# Patient Record
Sex: FEMALE | Race: WHITE | NOT HISPANIC OR LATINO | Employment: FULL TIME | URBAN - METROPOLITAN AREA
[De-identification: names, ages, dates, MRNs, and addresses within clinical notes are randomized per-mention and may not be internally consistent; named-entity substitution may affect disease eponyms.]

---

## 2018-10-19 ENCOUNTER — OFFICE VISIT (OUTPATIENT)
Dept: INTERNAL MEDICINE | Facility: CLINIC | Age: 38
End: 2018-10-19
Payer: COMMERCIAL

## 2018-10-19 VITALS
HEART RATE: 95 BPM | HEIGHT: 65 IN | BODY MASS INDEX: 31.63 KG/M2 | DIASTOLIC BLOOD PRESSURE: 68 MMHG | SYSTOLIC BLOOD PRESSURE: 135 MMHG | WEIGHT: 189.81 LBS

## 2018-10-19 DIAGNOSIS — Z00.00 ANNUAL PHYSICAL EXAM: Primary | ICD-10-CM

## 2018-10-19 PROCEDURE — 99999 PR PBB SHADOW E&M-EST. PATIENT-LVL III: CPT | Mod: PBBFAC,,, | Performed by: INTERNAL MEDICINE

## 2018-10-19 PROCEDURE — 99385 PREV VISIT NEW AGE 18-39: CPT | Mod: S$GLB,,, | Performed by: INTERNAL MEDICINE

## 2018-10-19 NOTE — PROGRESS NOTES
Answers for HPI/ROS submitted by the patient on 10/19/2018   activity change: No  unexpected weight change: Yes  neck pain: No  hearing loss: No  rhinorrhea: No  trouble swallowing: No  eye discharge: No  visual disturbance: No  chest tightness: No  wheezing: No  chest pain: No  palpitations: No  blood in stool: No  constipation: Yes  vomiting: No  diarrhea: No  polydipsia: No  polyuria: No  difficulty urinating: No  hematuria: No  menstrual problem: No  dysuria: No  joint swelling: No  arthralgias: Yes  headaches: No  weakness: No  confusion: No  dysphoric mood: No

## 2018-10-19 NOTE — PROGRESS NOTES
"Discussed Subjective:       Patient ID: Maria Alejandra Perez is a 38 y.o. female.    Chief Complaint: Annual Exam   This is a 38-year-old presents today for physical.  Patient reports that she has been doing well overall she has had history with her weight through her life in previously weighed about 300 lb she worked on improved exercise and dietary measures and got her weight down.  She reports that lately though it has been up again her lowest weight is about 150 and she feels like it is trending back up and is unable to bring it back down she does exercise 6 days a week she tries to watch her diet although isn't always is good that she does eat healthy most of the time.   She feels her health overall is good she does not usually get sick and her pressure is usually good at home she gets quite nervous when she comes to the doctor and feels her heart rate and pressure go up.  She was wondering about her thyroid her hormones she did have a hysterectomy when she was younger due to heavy menstrual cycles and fibroids    HPI  Review of Systems   Constitutional: Positive for unexpected weight change. Negative for activity change.   HENT: Negative for hearing loss, rhinorrhea and trouble swallowing.    Eyes: Negative for discharge and visual disturbance.   Respiratory: Negative for chest tightness and wheezing.    Cardiovascular: Negative for chest pain and palpitations.   Gastrointestinal: Positive for constipation. Negative for blood in stool, diarrhea and vomiting.   Endocrine: Negative for polydipsia and polyuria.   Genitourinary: Negative for difficulty urinating, dysuria, hematuria and menstrual problem.   Musculoskeletal: Positive for arthralgias. Negative for joint swelling and neck pain.   Neurological: Negative for weakness and headaches.   Psychiatric/Behavioral: Negative for confusion and dysphoric mood.       Objective:     Blood pressure 135/68, pulse 95, height 5' 5" (1.651 m), weight 86.1 kg (189 lb 13.1 " oz).    Physical Exam   Constitutional: No distress.   HENT:   Head: Normocephalic.   Mouth/Throat: Oropharynx is clear and moist.   Eyes: No scleral icterus.   Neck: Neck supple.   Cardiovascular: Normal rate, regular rhythm and normal heart sounds. Exam reveals no gallop and no friction rub.   No murmur heard.  Pulmonary/Chest: Effort normal and breath sounds normal. No respiratory distress.   Abdominal: Soft. Bowel sounds are normal. She exhibits no mass. There is no tenderness.   Surgical scar abdomen   Musculoskeletal: She exhibits no edema.   Neurological: She is alert.   Skin: No erythema.   Psychiatric: She has a normal mood and affect.   Vitals reviewed.      Assessment:       1. Annual physical exam        Plan:       Maria Alejandra was seen today for annual exam.    Diagnoses and all orders for this visit:    Annual physical exam  -     Basic metabolic panel; Future  -     CBC auto differential; Future  -     Hepatic function panel; Future  -     Lipid panel; Future  -     TSH; Future  -     Follicle stimulating hormone; Future    She had her flu shot declined tetanus/pertussis     As for her blood pressure advised home blood pressure monitoring call with elevations in the guidelines were reviewed with her    For her weight advised continued exercise healthy dietary measures as she has been will update her blood work check her thyroid as well and review follow-up annually sooner if concern

## 2018-10-26 ENCOUNTER — PATIENT MESSAGE (OUTPATIENT)
Dept: INTERNAL MEDICINE | Facility: CLINIC | Age: 38
End: 2018-10-26

## 2018-10-29 ENCOUNTER — LAB VISIT (OUTPATIENT)
Dept: LAB | Facility: HOSPITAL | Age: 38
End: 2018-10-29
Attending: INTERNAL MEDICINE
Payer: COMMERCIAL

## 2018-10-29 DIAGNOSIS — Z00.00 ANNUAL PHYSICAL EXAM: ICD-10-CM

## 2018-10-29 LAB
ALBUMIN SERPL BCP-MCNC: 4 G/DL
ALP SERPL-CCNC: 76 U/L
ALT SERPL W/O P-5'-P-CCNC: 29 U/L
ANION GAP SERPL CALC-SCNC: 8 MMOL/L
AST SERPL-CCNC: 25 U/L
BASOPHILS # BLD AUTO: 0.03 K/UL
BASOPHILS NFR BLD: 0.4 %
BILIRUB DIRECT SERPL-MCNC: 0.2 MG/DL
BILIRUB SERPL-MCNC: 0.5 MG/DL
BUN SERPL-MCNC: 11 MG/DL
CALCIUM SERPL-MCNC: 9.4 MG/DL
CHLORIDE SERPL-SCNC: 108 MMOL/L
CHOLEST SERPL-MCNC: 184 MG/DL
CHOLEST/HDLC SERPL: 2.7 {RATIO}
CO2 SERPL-SCNC: 24 MMOL/L
CREAT SERPL-MCNC: 0.7 MG/DL
DIFFERENTIAL METHOD: ABNORMAL
EOSINOPHIL # BLD AUTO: 0.2 K/UL
EOSINOPHIL NFR BLD: 2.6 %
ERYTHROCYTE [DISTWIDTH] IN BLOOD BY AUTOMATED COUNT: 12.4 %
EST. GFR  (AFRICAN AMERICAN): >60 ML/MIN/1.73 M^2
EST. GFR  (NON AFRICAN AMERICAN): >60 ML/MIN/1.73 M^2
FSH SERPL-ACNC: 9.3 MIU/ML
GLUCOSE SERPL-MCNC: 88 MG/DL
HCT VFR BLD AUTO: 39.6 %
HDLC SERPL-MCNC: 68 MG/DL
HDLC SERPL: 37 %
HGB BLD-MCNC: 12.7 G/DL
LDLC SERPL CALC-MCNC: 97 MG/DL
LYMPHOCYTES # BLD AUTO: 2.7 K/UL
LYMPHOCYTES NFR BLD: 39.2 %
MCH RBC QN AUTO: 31.2 PG
MCHC RBC AUTO-ENTMCNC: 32.1 G/DL
MCV RBC AUTO: 97 FL
MONOCYTES # BLD AUTO: 0.5 K/UL
MONOCYTES NFR BLD: 6.5 %
NEUTROPHILS # BLD AUTO: 3.5 K/UL
NEUTROPHILS NFR BLD: 50.7 %
NONHDLC SERPL-MCNC: 116 MG/DL
PLATELET # BLD AUTO: 192 K/UL
PMV BLD AUTO: 10.4 FL
POTASSIUM SERPL-SCNC: 4.8 MMOL/L
PROT SERPL-MCNC: 6.8 G/DL
RBC # BLD AUTO: 4.07 M/UL
SODIUM SERPL-SCNC: 140 MMOL/L
TRIGL SERPL-MCNC: 95 MG/DL
TSH SERPL DL<=0.005 MIU/L-ACNC: 1.25 UIU/ML
WBC # BLD AUTO: 6.97 K/UL

## 2018-10-29 PROCEDURE — 85025 COMPLETE CBC W/AUTO DIFF WBC: CPT

## 2018-10-29 PROCEDURE — 80061 LIPID PANEL: CPT

## 2018-10-29 PROCEDURE — 80076 HEPATIC FUNCTION PANEL: CPT

## 2018-10-29 PROCEDURE — 83001 ASSAY OF GONADOTROPIN (FSH): CPT

## 2018-10-29 PROCEDURE — 84443 ASSAY THYROID STIM HORMONE: CPT

## 2018-10-29 PROCEDURE — 80048 BASIC METABOLIC PNL TOTAL CA: CPT

## 2018-10-29 PROCEDURE — 36415 COLL VENOUS BLD VENIPUNCTURE: CPT

## 2019-12-23 ENCOUNTER — OFFICE VISIT (OUTPATIENT)
Dept: URGENT CARE | Facility: CLINIC | Age: 39
End: 2019-12-23
Payer: COMMERCIAL

## 2019-12-23 VITALS
BODY MASS INDEX: 31.49 KG/M2 | SYSTOLIC BLOOD PRESSURE: 144 MMHG | TEMPERATURE: 99 F | OXYGEN SATURATION: 99 % | RESPIRATION RATE: 12 BRPM | HEIGHT: 65 IN | HEART RATE: 114 BPM | DIASTOLIC BLOOD PRESSURE: 86 MMHG | WEIGHT: 189 LBS

## 2019-12-23 DIAGNOSIS — M25.531 RIGHT WRIST PAIN: Primary | ICD-10-CM

## 2019-12-23 PROCEDURE — 99214 PR OFFICE/OUTPT VISIT, EST, LEVL IV, 30-39 MIN: ICD-10-PCS | Mod: S$GLB,,, | Performed by: NURSE PRACTITIONER

## 2019-12-23 PROCEDURE — 73110 XR WRIST COMPLETE 3 VIEWS RIGHT: ICD-10-PCS | Mod: RT,S$GLB,, | Performed by: RADIOLOGY

## 2019-12-23 PROCEDURE — 73110 X-RAY EXAM OF WRIST: CPT | Mod: RT,S$GLB,, | Performed by: RADIOLOGY

## 2019-12-23 PROCEDURE — 99214 OFFICE O/P EST MOD 30 MIN: CPT | Mod: S$GLB,,, | Performed by: NURSE PRACTITIONER

## 2019-12-24 NOTE — PATIENT INSTRUCTIONS
Please be aware your blood pressure was slightly elevated today -  Make sure to take your blood pressure medicines, eat a low salt diet and recheck your blood pressure to make sure it is not getting too elevated ( greater than 160/100).   Advised pt to take bp again when well - if still elevated f/u with pcp.     PLEASE READ YOUR DISCHARGE INSTRUCTIONS ENTIRELY AS IT CONTAINS IMPORTANT INFORMATION.    Tylenol and ibuprofen for pain    Rest, ice, compress, and elevate at home    Avoid prolonged use of the affected area until better.     Please return or see your primary care doctor if you develop new or worsening symptoms.     Please arrange follow up with your primary medical clinic as soon as possible. You must understand that you've received an Urgent Care treatment only and that you may be released before all of your medical problems are known or treated. You, the patient, will arrange for follow up as instructed. If your symptoms worsen or fail to improve you should go to the Emergency Room.    Self-Care for Strains and Sprains  Most minor strains and sprains can be treated with self-care. Recovering from a strain or sprain may take 6 to 8 weeks. Your self-care goal is to reduce pain and immobilize the injury to speed healing.     A sprain injures ligaments (tissue that connects bones to bones).        A strain injures muscles or tendons (tissue that connects muscles to bones).   Support the injured area  Wrapping the injured area provides support for short, necessary activities. Be careful not to wrap the area too tightly. This could cut off the blood supply.  · Support a wrist, elbow, or shoulder with a sling.  · Wrap an ankle or knee with an elastic bandage.  · Tape a finger or toe to the one next to it.  Use cold and heat  Cold reduces swelling. Both cold and heat reduce pain. Heat should not be used in the initial treatment of the injury. When using cold or heat, always place a towel between the pack and  your skin.  · Apply ice or a cold pack 10 to 15 minutes every hour youre awake for the first 2 days.  · After the swelling goes down, use cold or heat to control pain. Dont use heat late in the day, since it can cause swelling when youre not active.  Rest and elevate  Rest and elevation help your injury heal faster.  · Raise the injured area above your heart level.  · Keep the injured area from moving.  · Limit the use of the joint or limb.  Use medicine  · Aspirin reduces pain and swelling. (Note: Dont give aspirin to a child 18 or younger unless prescribed by the doctor.)  · Aspirin substitutes, such as ibuprofen, can reduce pain. Some substitutes reduce swelling, too. Ask your pharmacist which substitutes you can use.  Call your doctor if:  · The injured joint wont move, or bones make a grating sound when they move.  · You cant put weight on the injured area, even after 24 hours.  · The injured body part is cold, blue, or numb.  · The joint or limb appears bent or crooked.  · Pain increases or doesnt improve in 4 days.  · When pressing along the injured area, you notice a spot that is especially painful.   Date Last Reviewed: 9/29/2015  © 0897-4004 [employer]. [address] [place] [address], [place] [address]. All rights reserved. This information is not intended as a substitute for professional medical care. Always follow your healthcare professional's instructions.

## 2019-12-24 NOTE — PROGRESS NOTES
"Subjective:       Patient ID: Maria Alejandra Perez is a 39 y.o. female.    Vitals:  height is 5' 5" (1.651 m) and weight is 85.7 kg (189 lb). Her temperature is 99.3 °F (37.4 °C). Her blood pressure is 144/86 (abnormal) and her pulse is 114 (abnormal). Her respiration is 12 and oxygen saturation is 99%.     Chief Complaint: Wrist Injury    Pt. States she hit her wrist off of something       Constitution: Negative for chills, fatigue and fever.   HENT: Negative for congestion and sore throat.    Neck: Negative for painful lymph nodes.   Cardiovascular: Negative for chest pain and leg swelling.   Eyes: Negative for double vision and blurred vision.   Respiratory: Negative for cough and shortness of breath.    Gastrointestinal: Negative for nausea, vomiting and diarrhea.   Genitourinary: Negative for dysuria, frequency, urgency and history of kidney stones.   Musculoskeletal: Positive for pain. Negative for joint pain, joint swelling, muscle cramps and muscle ache.   Skin: Negative for color change, pale, rash and bruising.   Allergic/Immunologic: Negative for seasonal allergies.   Neurological: Negative for dizziness, history of vertigo, light-headedness, passing out and headaches.   Hematologic/Lymphatic: Negative for swollen lymph nodes.   Psychiatric/Behavioral: Negative for nervous/anxious, sleep disturbance and depression. The patient is not nervous/anxious.        Objective:      Physical Exam   Constitutional: She is oriented to person, place, and time. She appears well-developed and well-nourished. She is cooperative.  Non-toxic appearance. She does not appear ill. No distress.   HENT:   Head: Normocephalic and atraumatic. Head is without abrasion, without contusion and without laceration.   Right Ear: Hearing, tympanic membrane, external ear and ear canal normal. No hemotympanum.   Left Ear: Hearing, tympanic membrane, external ear and ear canal normal. No hemotympanum.   Nose: Nose normal. No mucosal edema, " rhinorrhea or nasal deformity. No epistaxis. Right sinus exhibits no maxillary sinus tenderness and no frontal sinus tenderness. Left sinus exhibits no maxillary sinus tenderness and no frontal sinus tenderness.   Mouth/Throat: Uvula is midline, oropharynx is clear and moist and mucous membranes are normal. No trismus in the jaw. Normal dentition. No uvula swelling. No posterior oropharyngeal erythema.   Eyes: Pupils are equal, round, and reactive to light. Conjunctivae, EOM and lids are normal. Right eye exhibits no discharge. Left eye exhibits no discharge. No scleral icterus.   Neck: Trachea normal, normal range of motion, full passive range of motion without pain and phonation normal. Neck supple. No spinous process tenderness and no muscular tenderness present. No neck rigidity. No tracheal deviation present.   Cardiovascular: Normal rate, regular rhythm, normal heart sounds, intact distal pulses and normal pulses.   Pulmonary/Chest: Effort normal and breath sounds normal. No respiratory distress.   Abdominal: Soft. Normal appearance and bowel sounds are normal. She exhibits no distension, no pulsatile midline mass and no mass. There is no tenderness.   Musculoskeletal: Normal range of motion. She exhibits no edema or deformity.        Right wrist: Normal.   Cap refill 2 seconds, right radial pulse 2+, sensation intact  No pain with flexion, extension, mild bruising to right arm   Neurological: She is alert and oriented to person, place, and time. She has normal strength. No cranial nerve deficit or sensory deficit. She exhibits normal muscle tone. She displays no seizure activity. Coordination normal. GCS eye subscore is 4. GCS verbal subscore is 5. GCS motor subscore is 6.   Skin: Skin is warm, dry, intact, not diaphoretic and not pale. Capillary refill takes less than 2 seconds. abrasion, burn, bruising and ecchymosis  Psychiatric: She has a normal mood and affect. Her speech is normal and behavior is  normal. Judgment and thought content normal. Cognition and memory are normal.   Nursing note and vitals reviewed.  X-ray Wrist Complete Right    Result Date: 12/23/2019  EXAMINATION: XR WRIST COMPLETE 3 VIEWS RIGHT CLINICAL HISTORY: Pain, unspecified TECHNIQUE: PA, lateral, and oblique views of the right wrist were performed. COMPARISON: None FINDINGS: No evidence of acute displaced fracture, dislocation, or osseous destructive process.  No radiopaque retained foreign body seen.     No acute osseous abnormality identified. Electronically signed by: Daniel Ferrara MD Date:    12/23/2019 Time:    18:28        Assessment:       1. Right wrist pain        Plan:         Right wrist pain  -     X-Ray Wrist Complete Right; Future; Expected date: 12/23/2019      Patient Instructions     Please be aware your blood pressure was slightly elevated today -  Make sure to take your blood pressure medicines, eat a low salt diet and recheck your blood pressure to make sure it is not getting too elevated ( greater than 160/100).   Advised pt to take bp again when well - if still elevated f/u with pcp.     PLEASE READ YOUR DISCHARGE INSTRUCTIONS ENTIRELY AS IT CONTAINS IMPORTANT INFORMATION.    Tylenol and ibuprofen for pain    Rest, ice, compress, and elevate at home    Avoid prolonged use of the affected area until better.     Please return or see your primary care doctor if you develop new or worsening symptoms.     Please arrange follow up with your primary medical clinic as soon as possible. You must understand that you've received an Urgent Care treatment only and that you may be released before all of your medical problems are known or treated. You, the patient, will arrange for follow up as instructed. If your symptoms worsen or fail to improve you should go to the Emergency Room.    Self-Care for Strains and Sprains  Most minor strains and sprains can be treated with self-care. Recovering from a strain or sprain may take 6 to  8 weeks. Your self-care goal is to reduce pain and immobilize the injury to speed healing.     A sprain injures ligaments (tissue that connects bones to bones).        A strain injures muscles or tendons (tissue that connects muscles to bones).   Support the injured area  Wrapping the injured area provides support for short, necessary activities. Be careful not to wrap the area too tightly. This could cut off the blood supply.  · Support a wrist, elbow, or shoulder with a sling.  · Wrap an ankle or knee with an elastic bandage.  · Tape a finger or toe to the one next to it.  Use cold and heat  Cold reduces swelling. Both cold and heat reduce pain. Heat should not be used in the initial treatment of the injury. When using cold or heat, always place a towel between the pack and your skin.  · Apply ice or a cold pack 10 to 15 minutes every hour youre awake for the first 2 days.  · After the swelling goes down, use cold or heat to control pain. Dont use heat late in the day, since it can cause swelling when youre not active.  Rest and elevate  Rest and elevation help your injury heal faster.  · Raise the injured area above your heart level.  · Keep the injured area from moving.  · Limit the use of the joint or limb.  Use medicine  · Aspirin reduces pain and swelling. (Note: Dont give aspirin to a child 18 or younger unless prescribed by the doctor.)  · Aspirin substitutes, such as ibuprofen, can reduce pain. Some substitutes reduce swelling, too. Ask your pharmacist which substitutes you can use.  Call your doctor if:  · The injured joint wont move, or bones make a grating sound when they move.  · You cant put weight on the injured area, even after 24 hours.  · The injured body part is cold, blue, or numb.  · The joint or limb appears bent or crooked.  · Pain increases or doesnt improve in 4 days.  · When pressing along the injured area, you notice a spot that is especially painful.   Date Last Reviewed:  9/29/2015  © 1107-3456 [employer]. [address] [place] [address], [place] [address]. All rights reserved. This information is not intended as a substitute for professional medical care. Always follow your healthcare professional's instructions.

## 2020-01-08 ENCOUNTER — OFFICE VISIT (OUTPATIENT)
Dept: INTERNAL MEDICINE | Facility: CLINIC | Age: 40
End: 2020-01-08
Payer: COMMERCIAL

## 2020-01-08 ENCOUNTER — LAB VISIT (OUTPATIENT)
Dept: LAB | Facility: HOSPITAL | Age: 40
End: 2020-01-08
Payer: COMMERCIAL

## 2020-01-08 ENCOUNTER — TELEPHONE (OUTPATIENT)
Dept: INTERNAL MEDICINE | Facility: CLINIC | Age: 40
End: 2020-01-08

## 2020-01-08 VITALS
DIASTOLIC BLOOD PRESSURE: 78 MMHG | HEIGHT: 65 IN | HEART RATE: 81 BPM | WEIGHT: 179.44 LBS | OXYGEN SATURATION: 99 % | BODY MASS INDEX: 29.9 KG/M2 | SYSTOLIC BLOOD PRESSURE: 148 MMHG

## 2020-01-08 DIAGNOSIS — R22.1 NECK MASS: ICD-10-CM

## 2020-01-08 LAB
BASOPHILS # BLD AUTO: 0.02 K/UL (ref 0–0.2)
BASOPHILS NFR BLD: 0.2 % (ref 0–1.9)
DIFFERENTIAL METHOD: ABNORMAL
EOSINOPHIL # BLD AUTO: 0.1 K/UL (ref 0–0.5)
EOSINOPHIL NFR BLD: 0.7 % (ref 0–8)
ERYTHROCYTE [DISTWIDTH] IN BLOOD BY AUTOMATED COUNT: 12.4 % (ref 11.5–14.5)
HCT VFR BLD AUTO: 41.2 % (ref 37–48.5)
HGB BLD-MCNC: 13.9 G/DL (ref 12–16)
LYMPHOCYTES # BLD AUTO: 2.2 K/UL (ref 1–4.8)
LYMPHOCYTES NFR BLD: 24.7 % (ref 18–48)
MCH RBC QN AUTO: 31.5 PG (ref 27–31)
MCHC RBC AUTO-ENTMCNC: 33.7 G/DL (ref 32–36)
MCV RBC AUTO: 93 FL (ref 82–98)
MONOCYTES # BLD AUTO: 0.7 K/UL (ref 0.3–1)
MONOCYTES NFR BLD: 7.6 % (ref 4–15)
NEUTROPHILS # BLD AUTO: 6 K/UL (ref 1.8–7.7)
NEUTROPHILS NFR BLD: 66.8 % (ref 38–73)
PLATELET # BLD AUTO: 224 K/UL (ref 150–350)
PMV BLD AUTO: 10.5 FL (ref 9.2–12.9)
RBC # BLD AUTO: 4.41 M/UL (ref 4–5.4)
WBC # BLD AUTO: 9.04 K/UL (ref 3.9–12.7)

## 2020-01-08 PROCEDURE — 3008F PR BODY MASS INDEX (BMI) DOCUMENTED: ICD-10-PCS | Mod: CPTII,S$GLB,, | Performed by: NURSE PRACTITIONER

## 2020-01-08 PROCEDURE — 99999 PR PBB SHADOW E&M-EST. PATIENT-LVL IV: ICD-10-PCS | Mod: PBBFAC,,, | Performed by: NURSE PRACTITIONER

## 2020-01-08 PROCEDURE — 99999 PR PBB SHADOW E&M-EST. PATIENT-LVL IV: CPT | Mod: PBBFAC,,, | Performed by: NURSE PRACTITIONER

## 2020-01-08 PROCEDURE — 3008F BODY MASS INDEX DOCD: CPT | Mod: CPTII,S$GLB,, | Performed by: NURSE PRACTITIONER

## 2020-01-08 PROCEDURE — 99214 OFFICE O/P EST MOD 30 MIN: CPT | Mod: S$GLB,,, | Performed by: NURSE PRACTITIONER

## 2020-01-08 PROCEDURE — 85025 COMPLETE CBC W/AUTO DIFF WBC: CPT

## 2020-01-08 PROCEDURE — 99214 PR OFFICE/OUTPT VISIT, EST, LEVL IV, 30-39 MIN: ICD-10-PCS | Mod: S$GLB,,, | Performed by: NURSE PRACTITIONER

## 2020-01-08 PROCEDURE — 36415 COLL VENOUS BLD VENIPUNCTURE: CPT

## 2020-01-08 RX ORDER — SPIRONOLACTONE 100 MG/1
TABLET, FILM COATED ORAL
COMMUNITY
Start: 2019-11-11 | End: 2021-10-14

## 2020-01-08 NOTE — TELEPHONE ENCOUNTER
----- Message from Yoana Roche sent at 1/8/2020  6:53 AM CST -----  Contact: Pt request via [facility]   Message     Appointment Request From: Maria Alejandra Perez    With Provider: Liss Kingston MD [[facility] - Internal Medicine]    Preferred Date Range: 1/8/2020 - 1/31/2020    Preferred Times: Any time    Reason for visit: Existing Patient    Comments:  I have a small lump in my neck that has been there for over a week. I'd like to have it checked out asap.

## 2020-01-08 NOTE — PROGRESS NOTES
"INTERNAL MEDICINE CLINIC - SAME DAY APPOINTMENT  Progress Note    PRESENTING HISTORY     PCP: Liss Kingston MD  Chief Complaint/Reason for Visit:   No chief complaint on file.      History of Present Illness & ROS : Ms. Maria Alejandra Perez is a 39 y.o. female.    Here for UC visit.  New to this Provider.   Reports that "noticed small lump to base of neck some time ago, but now have 2 more on the same side". Denies pain to the sites.   Denies recent "URI or cold symptoms", including "ear pain or sore throat and / or congestion".    Denies headaches or aching joints.   She reports that "didn't know they were there, don't hurt, until I felt the side of my neck".   No recent travel.     Review of Systems:  Eyes: denies visual changes at this time denies floaters   ENT: no nasal congestion or sore throat  Respiratory: no cough or shorness of breath  Cardiovascular: no chest pain or palpitations  Gastrointestinal: no nausea or vomiting, no abdominal pain or change in bowel habits  Genitourinary: no hematuria or dysuria; denies frequency  Hematologic/Lymphatic: no easy bruising or lymphadenopathy  Musculoskeletal: no arthralgias or myalgias  Neurological: no seizures or tremors  Endocrine: no heat or cold intolerance        PAST HISTORY:     Past Medical History:   Diagnosis Date    Fibroids     Right arm fracture        Past Surgical History:   Procedure Laterality Date     SECTION      HYSTERECTOMY      DVH, no BSO for fibroids. Dr. Metcalf    right arm surgery prior fracture  1986       Family History   Problem Relation Age of Onset    Hypertension Father     Cancer Maternal Grandmother         esophageal cancer     Cancer Maternal Grandfather     Breast cancer Neg Hx     Colon cancer Neg Hx     Ovarian cancer Neg Hx        Social History     Socioeconomic History    Marital status:      Spouse name: Not on file    Number of children: Not on file    Years of education: Not on file    " Highest education level: Not on file   Occupational History    Not on file   Social Needs    Financial resource strain: Not on file    Food insecurity:     Worry: Not on file     Inability: Not on file    Transportation needs:     Medical: Not on file     Non-medical: Not on file   Tobacco Use    Smoking status: Former Smoker   Substance and Sexual Activity    Alcohol use: No    Drug use: No    Sexual activity: Yes     Birth control/protection: None     Comment:     Lifestyle    Physical activity:     Days per week: Not on file     Minutes per session: Not on file    Stress: Not on file   Relationships    Social connections:     Talks on phone: Not on file     Gets together: Not on file     Attends [employer]ism service: Not on file     Active member of club or organization: Not on file     Attends meetings of clubs or organizations: Not on file     Relationship status: Not on file   Other Topics Concern    Not on file   Social History Narrative    Not on file       MEDICATIONS & ALLERGIES:     No current outpatient medications on file prior to visit.     No current facility-administered medications on file prior to visit.         Review of patient's allergies indicates:  No Known Allergies    Medications Reconciliation:   I have reconciled the patient's home medications with the patient/family. I have updated all changes.  Refer to After-Visit Medication List.    OBJECTIVE:     Vital Signs:  There were no vitals filed for this visit.  Wt Readings from Last 1 Encounters:   12/23/19 1815 85.7 kg (189 lb)     There is no height or weight on file to calculate BMI.   Wt Readings from Last 3 Encounters:   01/08/20 81.4 kg (179 lb 7.3 oz)   12/23/19 85.7 kg (189 lb)   10/19/18 86.1 kg (189 lb 13.1 oz)     Temp Readings from Last 3 Encounters:   12/23/19 99.3 °F (37.4 °C)     BP Readings from Last 3 Encounters:   01/08/20 (!) 170/100   12/23/19 (!) 144/86   10/19/18 135/68     Pulse Readings from Last 3  "Encounters:   01/08/20 81   12/23/19 (!) 114   10/19/18 95     Repeat BP per Provider: 148/78      Physical Exam:  General: Well developed, well nourished. No distress.  HEENT: Head is normocephalic, atraumatic; ears are normal.   Posterior oral Pharynx unremarkable.   Left lateral Neck wall with deep palpation of painless, immobile dime size mass distal to ear lobe, and 2 more (marble size, proximally)   Eyes: Clear conjunctiva.  Neck: Supple, symmetrical neck; trachea midline.  Lungs: Clear to auscultation bilaterally and normal respiratory effort.  Cardiovascular: Heart with regular rate and rhythm. No murmurs, gallops or rubs  Extremities: No LE edema. Pulses 2+ and symmetric.   Abdomen: Abdomen is soft, non-tender non-distended with normal bowel sounds.  Skin: Skin color, texture, turgor normal. No rashes.  Musculoskeletal: Normal gait.   Lymph Nodes: No cervical or supraclavicular adenopathy.  Neurologic: Normal strength and tone. No focal numbness or weakness.   Psychiatric: Not depressed.        Laboratory  Lab Results   Component Value Date    WBC 6.97 10/29/2018    HGB 12.7 10/29/2018    HCT 39.6 10/29/2018     10/29/2018    CHOL 184 10/29/2018    TRIG 95 10/29/2018    HDL 68 10/29/2018    ALT 29 10/29/2018    AST 25 10/29/2018     10/29/2018    K 4.8 10/29/2018     10/29/2018    CREATININE 0.7 10/29/2018    BUN 11 10/29/2018    CO2 24 10/29/2018    TSH 1.245 10/29/2018       ASSESSMENT & PLAN:     Here for Same Day Appt    Several Immobile, Painless Neck masses:   ? Lymph Nodes (the distal one is getting larger, has reportedly been there "for years"; however, she noticed "2 more new ones recently")  Not experiencing any infectious causes that would depict a possible reactive-mounted LAD response.  -     CBC auto differential; Future; Expected date: 01/08/2020  -     CT Soft Tissue Neck WO Contrast; Future; Expected date: 01/08/2020 (further delineate with imaging)      Future " Appointments   Date Time Provider Department Center   1/9/2020 11:15 AM [facility] 500 LB LIMIT [facility]        Medication List           Accurate as of January 8, 2020 11:35 AM. If you have any questions, ask your nurse or doctor.               CONTINUE taking these medications    spironolactone 100 MG tablet  Commonly known as:  ALDACTONE          Signing Physician:  SARAVANAN Glasgow

## 2020-01-09 ENCOUNTER — HOSPITAL ENCOUNTER (OUTPATIENT)
Dept: RADIOLOGY | Facility: HOSPITAL | Age: 40
Discharge: HOME | End: 2020-01-09
Attending: NURSE PRACTITIONER
Payer: COMMERCIAL

## 2020-01-09 ENCOUNTER — TELEPHONE (OUTPATIENT)
Dept: INTERNAL MEDICINE | Facility: CLINIC | Age: 40
End: 2020-01-09

## 2020-01-09 DIAGNOSIS — R22.1 NECK MASS: ICD-10-CM

## 2020-01-09 DIAGNOSIS — R59.0 ENLARGED LYMPH NODE IN NECK: Primary | ICD-10-CM

## 2020-01-09 PROCEDURE — 70491 CT SOFT TISSUE NECK W/DYE: CPT | Mod: TC

## 2020-01-09 PROCEDURE — 70491 CT SOFT TISSUE NECK WITH CONTRAST: ICD-10-PCS | Mod: 26,,, | Performed by: RADIOLOGY

## 2020-01-09 PROCEDURE — 25500020 PHARM REV CODE 255: Performed by: NURSE PRACTITIONER

## 2020-01-09 PROCEDURE — 70491 CT SOFT TISSUE NECK W/DYE: CPT | Mod: 26,,, | Performed by: RADIOLOGY

## 2020-01-09 RX ADMIN — IOHEXOL 75 ML: 350 INJECTION, SOLUTION INTRAVENOUS at 12:01

## 2020-01-09 NOTE — TELEPHONE ENCOUNTER
"CT neck with Cervical chained lymph nodes (L > R) which corrleates clinically, but having no symptoms at this time or recently, in terms of "infectious" process.   CBC normal.   She is with voiced concern in regards to "the one on the left side of neck getting larger and some new ones on that side".     Will refer to ENT  SDJ    "

## 2020-01-10 ENCOUNTER — PATIENT OUTREACH (OUTPATIENT)
Dept: ADMINISTRATIVE | Facility: OTHER | Age: 40
End: 2020-01-10

## 2020-01-10 ENCOUNTER — PATIENT MESSAGE (OUTPATIENT)
Dept: INTERNAL MEDICINE | Facility: CLINIC | Age: 40
End: 2020-01-10

## 2020-01-10 NOTE — PROGRESS NOTES
Chart reviewed.   Immunizations: LINKS failed. Unable to find patient    Orders placed: n/a  Upcoming appts: n/a

## 2020-01-13 ENCOUNTER — OFFICE VISIT (OUTPATIENT)
Dept: OTOLARYNGOLOGY | Facility: CLINIC | Age: 40
End: 2020-01-13
Payer: COMMERCIAL

## 2020-01-13 VITALS
WEIGHT: 180 LBS | HEIGHT: 65 IN | HEART RATE: 84 BPM | TEMPERATURE: 99 F | SYSTOLIC BLOOD PRESSURE: 138 MMHG | BODY MASS INDEX: 29.99 KG/M2 | DIASTOLIC BLOOD PRESSURE: 81 MMHG

## 2020-01-13 DIAGNOSIS — I88.1 CHRONIC ADENITIS: Primary | ICD-10-CM

## 2020-01-13 DIAGNOSIS — J30.9 ALLERGIC RHINITIS, UNSPECIFIED SEASONALITY, UNSPECIFIED TRIGGER: ICD-10-CM

## 2020-01-13 DIAGNOSIS — J34.2 NASAL SEPTAL DEVIATION: ICD-10-CM

## 2020-01-13 DIAGNOSIS — J35.8 TONSILLITH: ICD-10-CM

## 2020-01-13 DIAGNOSIS — J35.1 HYPERTROPHY OF TONSIL: ICD-10-CM

## 2020-01-13 PROCEDURE — 3008F BODY MASS INDEX DOCD: CPT | Mod: CPTII,S$GLB,, | Performed by: SPECIALIST

## 2020-01-13 PROCEDURE — 99204 OFFICE O/P NEW MOD 45 MIN: CPT | Mod: 25,S$GLB,, | Performed by: SPECIALIST

## 2020-01-13 PROCEDURE — 99204 PR OFFICE/OUTPT VISIT, NEW, LEVL IV, 45-59 MIN: ICD-10-PCS | Mod: 25,S$GLB,, | Performed by: SPECIALIST

## 2020-01-13 PROCEDURE — 31575 DIAGNOSTIC LARYNGOSCOPY: CPT | Mod: S$GLB,,, | Performed by: SPECIALIST

## 2020-01-13 PROCEDURE — 3008F PR BODY MASS INDEX (BMI) DOCUMENTED: ICD-10-PCS | Mod: CPTII,S$GLB,, | Performed by: SPECIALIST

## 2020-01-13 PROCEDURE — 31575 LARYNGOSCOPY: ICD-10-PCS | Mod: S$GLB,,, | Performed by: SPECIALIST

## 2020-01-13 RX ORDER — DOXYCYCLINE HYCLATE 100 MG
100 TABLET ORAL 2 TIMES DAILY
Qty: 28 TABLET | Refills: 1 | Status: SHIPPED | OUTPATIENT
Start: 2020-01-13 | End: 2020-09-03

## 2020-01-13 NOTE — LETTER
January 13, 2020      Felicia Dodge, FNP  [address]  [facility] [address]           [facility] [place] [address]  [address]  [facility] [address]  Phone: [phone]  Fax: [phone]          Patient: Maria Alejandra Perez   MR Number: [id number]   YOB: 1980   Date of Visit: 1/13/2020       Dear Felicia Dodge:    Thank you for referring Maria Alejandra Perez to me for evaluation. Attached you will find relevant portions of my assessment and plan of care.    If you have questions, please do not hesitate to call me. I look forward to following Maria Alejandra Perez along with you.    Sincerely,    PERFECTO Tafoya MD    Enclosure  CC:  No Recipients    If you would like to receive this communication electronically, please contact [email or web address] or [phone] to request more information on [employer] access.    For providers and/or their staff who would like to refer a patient to [facility], please contact us through our one-stop-shop provider referral line, [facility], at [phone].    If you feel you have received this communication in error or would no longer like to receive these types of communications, please e-mail [email or web address]

## 2020-01-13 NOTE — PROCEDURES
"Laryngoscopy  Date/Time: 1/13/2020 1:00 PM  Performed by: PERFECTO Tafoya MD  Authorized by: PERFECTO Tafoya MD     Time out: Immediately prior to procedure a "time out" was called to verify the correct patient, procedure, equipment, support staff and site/side marked as required.    Consent Done?:  Yes (Verbal)  Anesthesia:     Local anesthetic:  Lidocaine 2% and Neel-Synephrine 1/2% (Topical aerosol)    Patient tolerance:  Patient tolerated the procedure well with no immediate complications    Decongestion performed?: Yes    Laryngoscopy:     Areas examined:  Vocal cords, larynx, hypopharynx, oropharynx, nasopharynx and nasal cavities    Laryngoscope size:  4 mm (Flexible video nasolaryngoscopy)  Nose External:      No external nasal deformity  Nose Intranasal:      Mucosa no polyps     Mucosa ulcers not present     No mucosa lesions present ( clearMucus in the nasal passages bilaterally)     Septum gross deformity ( septum deviated to the left)     Enlarged turbinates ( inferior turbinates enlarged bilaterally)  Nasopharynx:      No mucosa lesions     Adenoids not present     Posterior choanae patent     Eustachian tube patent  Larynx/hypopharynx:      No epiglottis lesions     No epiglottis edema     No AE folds lesions     No vocal cord polyps     Equal and normal bilateral ( vocal cords move symmetrically, no mucosal lesions noted)     No hypopharynx lesions     No piriform sinus pooling     No piriform sinus lesions     No post cricoid edema     No post cricoid erythema      Flexible video nasolaryngoscopy -septum deviated left, nasal changes of allergic rhinitis, no significant abnormalities  In the nasopharynx, oropharynx or hypopharyngeal area      "

## 2020-01-13 NOTE — PROGRESS NOTES
Subjective:       Patient ID: Maria Alejandra Perez is a 39 y.o. female.    Chief Complaint: LUMP LEFT SIDE OF NECK    The patient has had an enlarged lymph node in her left neck for the last 10 years.  It has not changed size days not painful.  More recently she has developed additional lymph nodes over the last few months in her neck area.  She does not have fever.  She does have night sweats that, go and hot flashes that, go.  She did undergo a hysterectomy and a 3rd.  He has had no weight loss.  Her appetite is normal. She does not have fever.    Review of Systems   Constitutional: Negative for activity change, appetite change, chills, fatigue, fever and unexpected weight change.        Night sweats  Hot flashes   HENT: Positive for congestion, ear pain, postnasal drip, rhinorrhea and sore throat. Negative for ear discharge, facial swelling, hearing loss, mouth sores, sinus pressure, sinus pain, sneezing, tinnitus, trouble swallowing and voice change.    Eyes: Negative for photophobia, pain, discharge, redness, itching and visual disturbance.   Respiratory: Positive for cough. Negative for apnea, choking, shortness of breath and wheezing.    Cardiovascular: Negative for chest pain and palpitations.   Gastrointestinal: Negative for abdominal distention, abdominal pain, nausea and vomiting.   Musculoskeletal: Negative for arthralgias, myalgias, neck pain and neck stiffness.   Skin: Negative.  Negative for color change, pallor and rash.   Allergic/Immunologic: Negative for environmental allergies, food allergies and immunocompromised state.   Neurological: Positive for headaches. Negative for dizziness, facial asymmetry, speech difficulty, weakness, light-headedness and numbness.   Hematological: Positive for adenopathy. Does not bruise/bleed easily.   Psychiatric/Behavioral: Negative for confusion, decreased concentration and sleep disturbance.       Objective:      Physical Exam   Constitutional: She is oriented to  person, place, and time. She appears well-developed and well-nourished. She is cooperative.   HENT:   Head: Normocephalic.   Right Ear: External ear and ear canal normal. Tympanic membrane is retracted.   Left Ear: External ear and ear canal normal. Tympanic membrane is retracted.   Nose: Mucosal edema (cyanotic, boggy inferior turbinates bilaterally), rhinorrhea (clear mucus bilaterally) and septal deviation (To the right) present.   Mouth/Throat: Uvula is midline, oropharynx is clear and moist and mucous membranes are normal. No oral lesions. Tonsils are 3+ on the right. Tonsils are 3+ on the left. No tonsillar exudate ( tonsillith in the left inferior fall).   Eyes: Pupils are equal, round, and reactive to light. EOM and lids are normal. Right eye exhibits no discharge and no exudate. Left eye exhibits no discharge and no exudate. Right conjunctiva is injected. Left conjunctiva is injected.   Neck: Trachea normal and normal range of motion. No muscular tenderness present. No tracheal deviation present. No thyroid mass and no thyromegaly present.   Cardiovascular: Normal rate, regular rhythm, normal heart sounds and normal pulses.   Pulmonary/Chest: Effort normal and breath sounds normal. No stridor. She has no decreased breath sounds. She has no wheezes. She has no rhonchi. She has no rales.   Abdominal: Soft. Bowel sounds are normal. There is no tenderness.   Musculoskeletal: Normal range of motion.   Lymphadenopathy:        Head (right side): No submental, no submandibular, no preauricular, no posterior auricular and no occipital adenopathy present.        Head (left side): No submental, no submandibular, no preauricular, no posterior auricular and no occipital adenopathy present.     She has cervical adenopathy ( multiple anterior and posterior triangle nodes that of rubbery, nontender and movable).        Right cervical: Deep cervical ( multiple 1 cm nodes in the jugulodigastric area) and posterior cervical (  to 1 cm spinal accessory lymph nodes high in the change) adenopathy present. No superficial cervical adenopathy present.       Left cervical: Deep cervical ( multiple 1 cm nodes in the jugulodigastric area) and posterior cervical ( 11.5 cm 11.0 cm left spinal accessory nose time change) adenopathy present. No superficial cervical adenopathy present.   Neurological: She is alert and oriented to person, place, and time. She has normal strength. No cranial nerve deficit or sensory deficit. Gait normal.   Skin: Skin is warm and dry. No petechiae and no rash noted. No cyanosis. Nails show no clubbing.   Psychiatric: She has a normal mood and affect. Her speech is normal and behavior is normal. Judgment and thought content normal. Cognition and memory are normal.        flexible nasolaryngoscopy -septum deviated to the left, nasal changes of allergic rhinitis, no abnormalities of the nasopharynx, oropharynx or hypoph  aryngeal areas    Assessment:       1. Chronic adenitis    2. Tonsillith    3. Hypertrophy of tonsil    4. Nasal septal deviation    5. Allergic rhinitis, unspecified seasonality, unspecified trigger        Plan:        I am placing the patient on 2 weeks course of doxycycline.  I have offered either fine-needle aspirate or excision of lymph node as options.  The lateral will give a more definitive answer.  I will recheck her in 2 weeks.   she will discuss the options with her .

## 2020-01-23 ENCOUNTER — PATIENT OUTREACH (OUTPATIENT)
Dept: ADMINISTRATIVE | Facility: OTHER | Age: 40
End: 2020-01-23

## 2020-01-28 ENCOUNTER — OFFICE VISIT (OUTPATIENT)
Dept: OTOLARYNGOLOGY | Facility: CLINIC | Age: 40
End: 2020-01-28
Payer: COMMERCIAL

## 2020-01-28 VITALS
HEART RATE: 119 BPM | HEIGHT: 65 IN | DIASTOLIC BLOOD PRESSURE: 91 MMHG | SYSTOLIC BLOOD PRESSURE: 147 MMHG | BODY MASS INDEX: 30.35 KG/M2 | TEMPERATURE: 97 F | WEIGHT: 182.19 LBS

## 2020-01-28 DIAGNOSIS — J34.2 NASAL SEPTAL DEVIATION: ICD-10-CM

## 2020-01-28 DIAGNOSIS — J30.9 ALLERGIC RHINITIS, UNSPECIFIED SEASONALITY, UNSPECIFIED TRIGGER: ICD-10-CM

## 2020-01-28 DIAGNOSIS — J35.1 HYPERTROPHY OF TONSIL: ICD-10-CM

## 2020-01-28 DIAGNOSIS — J35.8 TONSILLITH: ICD-10-CM

## 2020-01-28 DIAGNOSIS — I88.1 CHRONIC ADENITIS: Primary | ICD-10-CM

## 2020-01-28 PROCEDURE — 99214 PR OFFICE/OUTPT VISIT, EST, LEVL IV, 30-39 MIN: ICD-10-PCS | Mod: S$GLB,,, | Performed by: SPECIALIST

## 2020-01-28 PROCEDURE — 99214 OFFICE O/P EST MOD 30 MIN: CPT | Mod: S$GLB,,, | Performed by: SPECIALIST

## 2020-01-28 PROCEDURE — 3008F PR BODY MASS INDEX (BMI) DOCUMENTED: ICD-10-PCS | Mod: CPTII,S$GLB,, | Performed by: SPECIALIST

## 2020-01-28 PROCEDURE — 3008F BODY MASS INDEX DOCD: CPT | Mod: CPTII,S$GLB,, | Performed by: SPECIALIST

## 2020-01-28 NOTE — PROGRESS NOTES
Subjective:       Patient ID: Maria Alejandra Perez is a 39 y.o. female.    Chief Complaint: Follow-up    The patient is coming in for a follow-up visit.  She  has had an enlarged lymph node in her left neck for the last 10 years.  It has not changed size and not painful.  More recently she has developed additional lymph nodes over the last few months in her neck area.  She does not have fever.  She does have night sweats that, go and hot flashes that, go.   She has finished a course of antibiotics with minimal change in the lymph node..    Follow-up   Associated symptoms include congestion, coughing, headaches and a sore throat. Pertinent negatives include no abdominal pain, arthralgias, chest pain, chills, fatigue, fever, myalgias, nausea, neck pain, numbness, rash, vomiting or weakness.     Review of Systems   Constitutional: Negative for activity change, appetite change, chills, fatigue, fever and unexpected weight change.        Night sweats  Hot flashes   HENT: Positive for congestion, ear pain, postnasal drip, rhinorrhea and sore throat. Negative for ear discharge, facial swelling, hearing loss, mouth sores, sinus pressure, sinus pain, sneezing, tinnitus, trouble swallowing and voice change.    Eyes: Negative for photophobia, pain, discharge, redness, itching and visual disturbance.   Respiratory: Positive for cough. Negative for apnea, choking, shortness of breath and wheezing.    Cardiovascular: Negative for chest pain and palpitations.   Gastrointestinal: Negative for abdominal distention, abdominal pain, nausea and vomiting.   Musculoskeletal: Negative for arthralgias, myalgias, neck pain and neck stiffness.   Skin: Negative.  Negative for color change, pallor and rash.   Allergic/Immunologic: Negative for environmental allergies, food allergies and immunocompromised state.   Neurological: Positive for headaches. Negative for dizziness, facial asymmetry, speech difficulty, weakness, light-headedness and  numbness.   Hematological: Positive for adenopathy. Does not bruise/bleed easily.   Psychiatric/Behavioral: Negative for confusion, decreased concentration and sleep disturbance.       Objective:      Physical Exam   Constitutional: She is oriented to person, place, and time. She appears well-developed and well-nourished. She is cooperative.   HENT:   Head: Normocephalic.   Right Ear: External ear and ear canal normal. Tympanic membrane is retracted.   Left Ear: External ear and ear canal normal. Tympanic membrane is retracted.   Nose: Mucosal edema (cyanotic, boggy inferior turbinates bilaterally), rhinorrhea (clear mucus bilaterally) and septal deviation (To the right) present.   Mouth/Throat: Uvula is midline, oropharynx is clear and moist and mucous membranes are normal. No oral lesions. Tonsils are 3+ on the right. Tonsils are 3+ on the left. No tonsillar exudate ( tonsillith in the left inferior fall).   Eyes: Pupils are equal, round, and reactive to light. EOM and lids are normal. Right eye exhibits no discharge and no exudate. Left eye exhibits no discharge and no exudate. Right conjunctiva is injected. Left conjunctiva is injected.   Neck: Trachea normal and normal range of motion. No muscular tenderness present. No tracheal deviation present. No thyroid mass and no thyromegaly present.   Cardiovascular: Normal rate, regular rhythm, normal heart sounds and normal pulses.   Pulmonary/Chest: Effort normal and breath sounds normal. No stridor. She has no decreased breath sounds. She has no wheezes. She has no rhonchi. She has no rales.   Abdominal: Soft. Bowel sounds are normal. There is no tenderness.   Musculoskeletal: Normal range of motion.   Lymphadenopathy:        Head (right side): No submental, no submandibular, no preauricular, no posterior auricular and no occipital adenopathy present.        Head (left side): No submental, no submandibular, no preauricular, no posterior auricular and no occipital  adenopathy present.     She has cervical adenopathy ( multiple anterior and posterior triangle nodes that of rubbery, nontender and movable).        Right cervical: Deep cervical ( multiple 1 cm nodes in the jugulodigastric area) and posterior cervical ( to 1 cm spinal accessory lymph nodes high in the change) adenopathy present. No superficial cervical adenopathy present.       Left cervical: Deep cervical ( multiple 1 cm nodes in the jugulodigastric area) and posterior cervical ( 11.5 cm 11.0 cm left spinal accessory nose time change) adenopathy present. No superficial cervical adenopathy present.   Neurological: She is alert and oriented to person, place, and time. She has normal strength. No cranial nerve deficit or sensory deficit. Gait normal.   Skin: Skin is warm and dry. No petechiae and no rash noted. No cyanosis. Nails show no clubbing.   Psychiatric: She has a normal mood and affect. Her speech is normal and behavior is normal. Judgment and thought content normal. Cognition and memory are normal.         Assessment:       1. Chronic adenitis    2. Allergic rhinitis, unspecified seasonality, unspecified trigger    3. Nasal septal deviation    4. Hypertrophy of tonsil    5. Tonsillith        Plan:       With the in significant change in size of the mass I have offered the patient 3 different treatment options.  The 1st is periodic observation.  The 2nd is ultrasound-guided fine-needle aspiration and the 3rd is excision of the lymph nodes.  At this point she does not wish to do anything invasive.  I explained to her that if she develops any systemic symptoms such as fever, night sweats, diminished appetite, weight loss, pain or swelling of the mass, enlargement of the mass in general she will need to undergo either FNA or excision.  I will recheck her in 8 weeks.

## 2020-02-03 ENCOUNTER — PATIENT OUTREACH (OUTPATIENT)
Dept: ADMINISTRATIVE | Facility: OTHER | Age: 40
End: 2020-02-03

## 2020-02-04 ENCOUNTER — HOSPITAL ENCOUNTER (OUTPATIENT)
Dept: RADIOLOGY | Facility: HOSPITAL | Age: 40
Discharge: HOME | End: 2020-02-04
Attending: ORTHOPAEDIC SURGERY
Payer: COMMERCIAL

## 2020-02-04 ENCOUNTER — OFFICE VISIT (OUTPATIENT)
Dept: SPORTS MEDICINE | Facility: CLINIC | Age: 40
End: 2020-02-04
Payer: COMMERCIAL

## 2020-02-04 VITALS
SYSTOLIC BLOOD PRESSURE: 168 MMHG | WEIGHT: 182 LBS | DIASTOLIC BLOOD PRESSURE: 93 MMHG | HEART RATE: 89 BPM | BODY MASS INDEX: 30.32 KG/M2 | HEIGHT: 65 IN

## 2020-02-04 DIAGNOSIS — M25.562 LEFT KNEE PAIN, UNSPECIFIED CHRONICITY: ICD-10-CM

## 2020-02-04 DIAGNOSIS — M67.52 SYNOVIAL PLICA SYNDROME OF LEFT KNEE: Primary | ICD-10-CM

## 2020-02-04 PROCEDURE — 73562 X-RAY EXAM OF KNEE 3: CPT | Mod: 26,RT,, | Performed by: RADIOLOGY

## 2020-02-04 PROCEDURE — 99999 PR PBB SHADOW E&M-EST. PATIENT-LVL III: CPT | Mod: PBBFAC,,, | Performed by: ORTHOPAEDIC SURGERY

## 2020-02-04 PROCEDURE — 73562 X-RAY EXAM OF KNEE 3: CPT | Mod: TC,RT

## 2020-02-04 PROCEDURE — 99203 OFFICE O/P NEW LOW 30 MIN: CPT | Mod: S$GLB,,, | Performed by: ORTHOPAEDIC SURGERY

## 2020-02-04 PROCEDURE — 99203 PR OFFICE/OUTPT VISIT, NEW, LEVL III, 30-44 MIN: ICD-10-PCS | Mod: S$GLB,,, | Performed by: ORTHOPAEDIC SURGERY

## 2020-02-04 PROCEDURE — 3008F PR BODY MASS INDEX (BMI) DOCUMENTED: ICD-10-PCS | Mod: CPTII,S$GLB,, | Performed by: ORTHOPAEDIC SURGERY

## 2020-02-04 PROCEDURE — 73564 X-RAY EXAM KNEE 4 OR MORE: CPT | Mod: 26,LT,, | Performed by: RADIOLOGY

## 2020-02-04 PROCEDURE — 73564 XR KNEE ORTHO LEFT WITH FLEXION: ICD-10-PCS | Mod: 26,LT,, | Performed by: RADIOLOGY

## 2020-02-04 PROCEDURE — 99999 PR PBB SHADOW E&M-EST. PATIENT-LVL III: ICD-10-PCS | Mod: PBBFAC,,, | Performed by: ORTHOPAEDIC SURGERY

## 2020-02-04 PROCEDURE — 73562 XR KNEE ORTHO LEFT WITH FLEXION: ICD-10-PCS | Mod: 26,RT,, | Performed by: RADIOLOGY

## 2020-02-04 PROCEDURE — 3008F BODY MASS INDEX DOCD: CPT | Mod: CPTII,S$GLB,, | Performed by: ORTHOPAEDIC SURGERY

## 2020-02-04 NOTE — PROGRESS NOTES
CC: LEFT knee pain    39 y.o. Female who presents as a new patient to me. She works as a . Reports that 10 days ago, she woke up with significant burning pain over the medial side of the left knee. States it feels like there is a band of tissue over the medial aspect of the knee that snaps and causes transient pain with deep lunges. Reports that this pain has got a better with ice, bracing and anti-inflammatories, to the point where it pretty much completely resolved 3 days later.  She denies any pain at this visit or over the last week.  Denies swelling or effusions. No mechanical symptoms otherwise. Denies instability. She has had some soreness in the knee previously but nothing like this.  No significant prior issues.  Treatment thus far has included rest, activity modifications, oral medications.  Here today to discuss diagnosis and treatment options.      PMHx notable for Negative.   Negative for tobacco.   Negative for diabetes.     Pain Score: 0-No pain    REVIEW OF SYSTEMS:   Constitution: Negative. Negative for chills, fever and night sweats.    Hematologic/Lymphatic: Negative for bleeding problem. Does not bruise/bleed easily.   Skin: Negative for dry skin, itching and rash.   Musculoskeletal: Negative for falls. Positive for left knee pain and  muscle weakness.     All other review of symptoms were reviewed and found to be noncontributory.     PAST MEDICAL HISTORY:   Past Medical History:   Diagnosis Date    Fibroids     Right arm fracture      PAST SURGICAL HISTORY:   Past Surgical History:   Procedure Laterality Date     SECTION      HYSTERECTOMY      DVH, no BSO for fibroids. Dr. Metcalf    LEFT THUMB SURGERY      right arm surgery prior fracture       FAMILY HISTORY:   Family History   Problem Relation Age of Onset    Hypertension Father     Cancer Maternal Grandmother         esophageal cancer     Cancer Maternal Grandfather     Breast cancer Neg Hx      "Colon cancer Neg Hx     Ovarian cancer Neg Hx      SOCIAL HISTORY:   Social History     Socioeconomic History    Marital status:      Spouse name: Not on file    Number of children: Not on file    Years of education: Not on file    Highest education level: Not on file   Occupational History    Not on file   Social Needs    Financial resource strain: Not on file    Food insecurity:     Worry: Not on file     Inability: Not on file    Transportation needs:     Medical: Not on file     Non-medical: Not on file   Tobacco Use    Smoking status: Former Smoker    Smokeless tobacco: Never Used   Substance and Sexual Activity    Alcohol use: No    Drug use: No    Sexual activity: Yes     Birth control/protection: None     Comment:     Lifestyle    Physical activity:     Days per week: Not on file     Minutes per session: Not on file    Stress: Not on file   Relationships    Social connections:     Talks on phone: Not on file     Gets together: Not on file     Attends [employer] service: Not on file     Active member of club or organization: Not on file     Attends meetings of clubs or organizations: Not on file     Relationship status: Not on file   Other Topics Concern    Not on file   Social History Narrative    Not on file     MEDICATIONS:     Current Outpatient Medications:     doxycycline (VIBRA-TABS) 100 MG tablet, Take 1 tablet (100 mg total) by mouth 2 (two) times daily., Disp: 28 tablet, Rfl: 1    spironolactone (ALDACTONE) 100 MG tablet, , Disp: , Rfl:     ALLERGIES:   Review of patient's allergies indicates:  No Known Allergies     PHYSICAL EXAMINATION:  BP (!) 168/93   Pulse 89   Ht 5' 5" (1.651 m)   Wt 82.6 kg (182 lb)   BMI 30.29 kg/m²   General: Well-developed well-nourished 39 y.o. femalein no acute distress   Cardiovascular: Regular rhythm by palpation of distal pulse, normal color and temperature, no concerning varicosities on symptomatic side   Lungs: No labored " breathing or wheezing appreciated   Neuro: Alert and oriented ×3   Psychiatric: well oriented to person, place and time, demonstrates normal mood and affect   Skin: No rashes, lesions or ulcers, normal temperature, turgor, and texture on involved extremity    Ortho/SPM Exam  Examination of the left knee demonstrates intact extensor mechanism. No effusion or prepatellar swelling. Central patellar tracking. No patellar apprehension. Normal patellar mobility. Full extension. Flexion to 130. No pain with forced flexion or extension. No tenderness along the medial and lateral joint line. Negative Angel's. Negative Lachman. Stable to varus/valgus stress testing at 0 and 30 deg. Negative posterior drawer. Ligamentously stable.    IMAGING:  X-rays including standing, weight bearing AP and flexion bilateral knees, LEFT knee lateral and sunrise views ordered and images reviewed by me show:    Minimal osteophyte formation of the patella and trochlea on skyline views.  Joint spaces well preserved.  No fractures or dislocations.    ASSESSMENT:      ICD-10-CM ICD-9-CM   1. Synovial plica syndrome of left knee M67.52 727.83   2. Left knee pain, unspecified chronicity M25.562 719.46       PLAN:     The patient has what sounds to be some degree of a medial plica syndrome.  Transient episode of pain.  Much better now.  No significant issues are findings on exam today. No clinical concern for meniscus pathology.  She does have some very mild early degenerative changes of the knee.  Discussed avoidance of lunge activities.  Continue oral anti-inflammatory medication as needed. Return to clinic as needed. Next step would be MRI.      Procedures

## 2020-03-20 ENCOUNTER — PATIENT MESSAGE (OUTPATIENT)
Dept: OTOLARYNGOLOGY | Facility: CLINIC | Age: 40
End: 2020-03-20

## 2020-03-31 ENCOUNTER — PATIENT MESSAGE (OUTPATIENT)
Dept: OTOLARYNGOLOGY | Facility: CLINIC | Age: 40
End: 2020-03-31

## 2020-04-02 ENCOUNTER — PATIENT MESSAGE (OUTPATIENT)
Dept: OTOLARYNGOLOGY | Facility: CLINIC | Age: 40
End: 2020-04-02

## 2020-05-20 ENCOUNTER — PATIENT MESSAGE (OUTPATIENT)
Dept: OBSTETRICS AND GYNECOLOGY | Facility: CLINIC | Age: 40
End: 2020-05-20

## 2020-05-20 DIAGNOSIS — Z12.31 ENCOUNTER FOR SCREENING MAMMOGRAM FOR BREAST CANCER: Primary | ICD-10-CM

## 2020-05-27 ENCOUNTER — PATIENT MESSAGE (OUTPATIENT)
Dept: OTOLARYNGOLOGY | Facility: CLINIC | Age: 40
End: 2020-05-27

## 2020-06-16 ENCOUNTER — OFFICE VISIT (OUTPATIENT)
Dept: OTOLARYNGOLOGY | Facility: CLINIC | Age: 40
End: 2020-06-16
Payer: COMMERCIAL

## 2020-06-16 VITALS
TEMPERATURE: 98 F | HEART RATE: 122 BPM | BODY MASS INDEX: 31.07 KG/M2 | SYSTOLIC BLOOD PRESSURE: 166 MMHG | WEIGHT: 186.75 LBS | DIASTOLIC BLOOD PRESSURE: 81 MMHG

## 2020-06-16 DIAGNOSIS — J35.8 TONSILLITH: ICD-10-CM

## 2020-06-16 DIAGNOSIS — I88.1 CHRONIC ADENITIS: ICD-10-CM

## 2020-06-16 PROCEDURE — 3008F PR BODY MASS INDEX (BMI) DOCUMENTED: ICD-10-PCS | Mod: CPTII,S$GLB,, | Performed by: OTOLARYNGOLOGY

## 2020-06-16 PROCEDURE — 99999 PR PBB SHADOW E&M-EST. PATIENT-LVL III: ICD-10-PCS | Mod: PBBFAC,,, | Performed by: OTOLARYNGOLOGY

## 2020-06-16 PROCEDURE — 99213 OFFICE O/P EST LOW 20 MIN: CPT | Mod: S$GLB,,, | Performed by: OTOLARYNGOLOGY

## 2020-06-16 PROCEDURE — 3008F BODY MASS INDEX DOCD: CPT | Mod: CPTII,S$GLB,, | Performed by: OTOLARYNGOLOGY

## 2020-06-16 PROCEDURE — 99213 PR OFFICE/OUTPT VISIT, EST, LEVL III, 20-29 MIN: ICD-10-PCS | Mod: S$GLB,,, | Performed by: OTOLARYNGOLOGY

## 2020-06-16 PROCEDURE — 99999 PR PBB SHADOW E&M-EST. PATIENT-LVL III: CPT | Mod: PBBFAC,,, | Performed by: OTOLARYNGOLOGY

## 2020-06-16 NOTE — PROGRESS NOTES
[facility]    Subjective:       Patient ID: Maria Alejandra Perez is a 40 y.o. female.    Chief Complaint: consult/enlarged lymph nodes on both sides of neck    HPI  Maria Alejandra Perez is a 40 y.o. female who presents with a several year history of enlarged lymph nodes in the left side of the neck. One has been present for at least 10 years, and another first appeared in 2020. There is no redness or warmth. She does not think that they are getting bigger. She is breathing comfortably and eating a regular diet. She denies dysphagia, odynophagia, throat pain, and otalgia. Her voice is normal. There is no hemoptysis or hematemesis. She has no history of local or regional skin cancers, and there is no history of recurrent skin infections. She has no axillary or inguinal adenopathy and denies fevers, chills, nightsweats, fatigue, and weight loss.    Outside of the presence of the lymph nodes, she feels fine. She works out 6 days per week and has no other symptoms.     Past Medical History:   Diagnosis Date    Acne 10/1/2018    Resolved through oral and topical medications    Fibroids     Right arm fracture        Past Surgical History:   Procedure Laterality Date     SECTION      HYSTERECTOMY      [facility], no BSO for fibroids. Dr. Metcalf    LEFT THUMB SURGERY      right arm surgery prior fracture  1986         Current Outpatient Medications:     spironolactone (ALDACTONE) 100 MG tablet, , Disp: , Rfl:     doxycycline (VIBRA-TABS) 100 MG tablet, Take 1 tablet (100 mg total) by mouth 2 (two) times daily., Disp: 28 tablet, Rfl: 1    Review of patient's allergies indicates:  No Known Allergies    Social History     Socioeconomic History    Marital status:      Spouse name: Not on file    Number of children: Not on file    Years of education: Not on file    Highest education level: Not on file   Occupational History    Not on file   Social Needs    Financial resource  strain: Not on file    Food insecurity     Worry: Not on file     Inability: Not on file    Transportation needs     Medical: Not on file     Non-medical: Not on file   Tobacco Use    Smoking status: Former Smoker     Packs/day: 1.00     Years: 10.00     Pack years: 10.00     Types: Cigarettes    Smokeless tobacco: Never Used   Substance and Sexual Activity    Alcohol use: Not Currently     Alcohol/week: 0.0 standard drinks    Drug use: No    Sexual activity: Yes     Partners: Male     Birth control/protection: See Surgical Hx, None     Comment:     Lifestyle    Physical activity     Days per week: Not on file     Minutes per session: Not on file    Stress: Not on file   Relationships    Social connections     Talks on phone: Not on file     Gets together: Not on file     Attends Yazidi service: Not on file     Active member of club or organization: Not on file     Attends meetings of clubs or organizations: Not on file     Relationship status: Not on file   Other Topics Concern    Not on file   Social History Narrative    Not on file       Family History   Problem Relation Age of Onset    Hypertension Father     Cancer Maternal Grandmother         esophageal cancer     Cancer Maternal Grandfather     Breast cancer Neg Hx     Colon cancer Neg Hx     Ovarian cancer Neg Hx        Review of Systems   Constitutional: Negative for activity change, appetite change, chills, fatigue, fever and unexpected weight change.   HENT: Negative for congestion, dental problem, drooling, ear discharge, ear pain, facial swelling, hearing loss, mouth sores, nosebleeds, postnasal drip, rhinorrhea, sinus pressure, sneezing, sore throat, tinnitus, trouble swallowing and voice change.    Eyes: Negative for pain and visual disturbance.   Respiratory: Negative for cough, choking and shortness of breath.    Cardiovascular: Negative for chest pain, palpitations and leg swelling.   Gastrointestinal: Negative for  abdominal pain, constipation, diarrhea, nausea and vomiting.   Endocrine: Negative for cold intolerance and heat intolerance.   Genitourinary: Negative for difficulty urinating, dysuria and hematuria.   Musculoskeletal: Negative for arthralgias, back pain, gait problem, myalgias, neck pain and neck stiffness.   Skin: Negative for rash and wound.   Allergic/Immunologic: Negative for environmental allergies and immunocompromised state.   Neurological: Negative for dizziness, facial asymmetry, speech difficulty, weakness, light-headedness, numbness and headaches.   Hematological: Negative for adenopathy. Does not bruise/bleed easily.   Psychiatric/Behavioral: Negative for dysphoric mood. The patient is not nervous/anxious.        Answers for HPI/ROS submitted by the patient on 6/14/2020   swollen glands: Yes  Objective:     Physical Exam  Vitals signs reviewed.   Constitutional:       General: She is not in acute distress.     Appearance: Normal appearance. She is well-developed.   HENT:      Head: Normocephalic and atraumatic.      Jaw: No trismus.      Right Ear: Hearing, tympanic membrane, ear canal and external ear normal.      Left Ear: Hearing, tympanic membrane, ear canal and external ear normal.      Nose: No nasal deformity, septal deviation or rhinorrhea.      Right Sinus: No maxillary sinus tenderness or frontal sinus tenderness.      Left Sinus: No maxillary sinus tenderness or frontal sinus tenderness.      Mouth/Throat:      Mouth: No oral lesions.      Dentition: Normal dentition. Does not have dentures. No dental caries.      Pharynx: Uvula midline. No oropharyngeal exudate.     Eyes:      General: Lids are normal. No scleral icterus.     Conjunctiva/sclera:      Right eye: No chemosis.     Left eye: No chemosis.     Pupils: Pupils are equal, round, and reactive to light.   Neck:      Musculoskeletal: Full passive range of motion without pain and normal range of motion. No muscular tenderness.       Thyroid: No thyroid mass or thyromegaly.      Trachea: Trachea and phonation normal.     Cardiovascular:      Rate and Rhythm: Normal rate and regular rhythm.   Pulmonary:      Effort: Pulmonary effort is normal. No accessory muscle usage or respiratory distress.      Breath sounds: No stridor.   Abdominal:      Tenderness: There is no abdominal tenderness.   Musculoskeletal:      Right shoulder: She exhibits normal range of motion and no deformity.      Left shoulder: She exhibits normal range of motion and no deformity.   Lymphadenopathy:      Head:      Right side of head: No submental or occipital adenopathy.      Left side of head: No submental or occipital adenopathy.      Cervical: No cervical adenopathy.      Right cervical: No deep or posterior cervical adenopathy.     Left cervical: No deep or posterior cervical adenopathy.      Comments: No pathologic adenopathy     Skin:     General: Skin is warm.      Findings: No lesion or rash.   Neurological:      Mental Status: She is alert and oriented to person, place, and time.      Cranial Nerves: No cranial nerve deficit.      Sensory: No sensory deficit.      Gait: Gait normal.   Psychiatric:         Speech: Speech normal.         Behavior: Behavior normal.         Thought Content: Thought content normal.          Assessment & Plan:       Problem List Items Addressed This Visit     Chronic adenitis     She has tiny, nonpathologic lymph nodes in level. She has benign cervical adenopathy which has not changed significantly over time.  There is no evidence of upper aerodigestive tract primary pathology, cutaneous pathology, or signs and suggestive of lymphoma to include type B symptoms or adenopathy in the axilla or groin. Her lymph nodes do not meet pathologic criteria. There has not been appreciable growth or change in these lymph nodes.  None of them meet pathologic criteria on physical exam. We discussed the function of lymph nodes in the body as a center  from which we mount an immune response to foreign antigens, including infection and even cancers.  Occasionally, the system can get clogged, resulting in persistent adenopathy (at the risk of oversimplification).  Perhaps there will be gradual resolution over time.  The patient was encouraged to return to see us if She developed any of the above symptoms, if the lymph nodes began to grow, or if there was accompanying redness or warmth to suggest acute infection.  Time was allowed for questions, and all questions were answered to the patient's apparent satisfaction. Reassurance was provided.            Tonsillith     Chronic, stable, unchanged

## 2020-06-18 NOTE — ASSESSMENT & PLAN NOTE
She has tiny, nonpathologic lymph nodes in level. She has benign cervical adenopathy which has not changed significantly over time.  There is no evidence of upper aerodigestive tract primary pathology, cutaneous pathology, or signs and suggestive of lymphoma to include type B symptoms or adenopathy in the axilla or groin. Her lymph nodes do not meet pathologic criteria. There has not been appreciable growth or change in these lymph nodes.  None of them meet pathologic criteria on physical exam. We discussed the function of lymph nodes in the body as a center from which we mount an immune response to foreign antigens, including infection and even cancers.  Occasionally, the system can get clogged, resulting in persistent adenopathy (at the risk of oversimplification).  Perhaps there will be gradual resolution over time.  The patient was encouraged to return to see us if She developed any of the above symptoms, if the lymph nodes began to grow, or if there was accompanying redness or warmth to suggest acute infection.  Time was allowed for questions, and all questions were answered to the patient's apparent satisfaction. Reassurance was provided.

## 2020-07-14 ENCOUNTER — PATIENT OUTREACH (OUTPATIENT)
Dept: ADMINISTRATIVE | Facility: OTHER | Age: 40
End: 2020-07-14

## 2020-07-14 NOTE — PROGRESS NOTES
Care Everywhere:   Immunization: updated  Health Maintenance: updated  Media Review:   Legacy Review:   Order placed:   Upcoming appts:mammogram 7.15.2020

## 2020-07-15 ENCOUNTER — HOSPITAL ENCOUNTER (OUTPATIENT)
Dept: RADIOLOGY | Facility: OTHER | Age: 40
Discharge: HOME | End: 2020-07-15
Attending: OBSTETRICS & GYNECOLOGY
Payer: COMMERCIAL

## 2020-07-15 VITALS — BODY MASS INDEX: 31.11 KG/M2 | WEIGHT: 186.75 LBS | HEIGHT: 65 IN

## 2020-07-15 DIAGNOSIS — Z12.31 ENCOUNTER FOR SCREENING MAMMOGRAM FOR BREAST CANCER: ICD-10-CM

## 2020-07-15 PROCEDURE — 77063 BREAST TOMOSYNTHESIS BI: CPT | Mod: 26,,, | Performed by: RADIOLOGY

## 2020-07-15 PROCEDURE — 77067 SCR MAMMO BI INCL CAD: CPT | Mod: TC

## 2020-07-15 PROCEDURE — 77067 MAMMO DIGITAL SCREENING BILAT WITH TOMOSYNTHESIS_CAD: ICD-10-PCS | Mod: 26,,, | Performed by: RADIOLOGY

## 2020-07-15 PROCEDURE — 77067 SCR MAMMO BI INCL CAD: CPT | Mod: 26,,, | Performed by: RADIOLOGY

## 2020-07-15 PROCEDURE — 77063 MAMMO DIGITAL SCREENING BILAT WITH TOMOSYNTHESIS_CAD: ICD-10-PCS | Mod: 26,,, | Performed by: RADIOLOGY

## 2020-09-01 ENCOUNTER — PATIENT OUTREACH (OUTPATIENT)
Dept: ADMINISTRATIVE | Facility: OTHER | Age: 40
End: 2020-09-01

## 2020-09-03 ENCOUNTER — OFFICE VISIT (OUTPATIENT)
Dept: OBSTETRICS AND GYNECOLOGY | Facility: CLINIC | Age: 40
End: 2020-09-03
Attending: OBSTETRICS & GYNECOLOGY
Payer: COMMERCIAL

## 2020-09-03 VITALS
BODY MASS INDEX: 31.05 KG/M2 | WEIGHT: 186.38 LBS | DIASTOLIC BLOOD PRESSURE: 100 MMHG | SYSTOLIC BLOOD PRESSURE: 150 MMHG | HEIGHT: 65 IN

## 2020-09-03 DIAGNOSIS — Z01.419 ENCOUNTER FOR GYNECOLOGICAL EXAMINATION (GENERAL) (ROUTINE) WITHOUT ABNORMAL FINDINGS: Primary | ICD-10-CM

## 2020-09-03 PROCEDURE — 99999 PR PBB SHADOW E&M-EST. PATIENT-LVL III: CPT | Mod: PBBFAC,,, | Performed by: OBSTETRICS & GYNECOLOGY

## 2020-09-03 PROCEDURE — 3008F BODY MASS INDEX DOCD: CPT | Mod: CPTII,S$GLB,, | Performed by: OBSTETRICS & GYNECOLOGY

## 2020-09-03 PROCEDURE — 3008F PR BODY MASS INDEX (BMI) DOCUMENTED: ICD-10-PCS | Mod: CPTII,S$GLB,, | Performed by: OBSTETRICS & GYNECOLOGY

## 2020-09-03 PROCEDURE — 99386 PREV VISIT NEW AGE 40-64: CPT | Mod: S$GLB,,, | Performed by: OBSTETRICS & GYNECOLOGY

## 2020-09-03 PROCEDURE — 99386 PR PREVENTIVE VISIT,NEW,40-64: ICD-10-PCS | Mod: S$GLB,,, | Performed by: OBSTETRICS & GYNECOLOGY

## 2020-09-03 PROCEDURE — 99999 PR PBB SHADOW E&M-EST. PATIENT-LVL III: ICD-10-PCS | Mod: PBBFAC,,, | Performed by: OBSTETRICS & GYNECOLOGY

## 2020-09-03 NOTE — PROGRESS NOTES
Subjective:       Patient ID: Maria Alejandra Perez is a 40 y.o. female.    Chief Complaint:  Annual Exam (mammo 2020 birads 1)      Patient Active Problem List   Diagnosis    H/O: hysterectomy    Chronic adenitis    Tonsillith    Hypertrophy of tonsil    Nasal septal deviation    Allergic rhinitis    Left knee pain    Synovial plica syndrome of left knee       History of Present Illness  40 y.o. yo  here for annual exam. No gyn complaints. Doing well. I did DVH, no BSO in . Cannot access op note. Overall doing well. No issues. Feels ovulations sometimes      Past Medical History:   Diagnosis Date    Acne 10/1/2018    Resolved through oral and topical medications    Fibroids     Right arm fracture        Past Surgical History:   Procedure Laterality Date     SECTION      HYSTERECTOMY      [facility], no BSO for fibroids. Dr. Metcalf    LEFT THUMB SURGERY      right arm surgery prior fracture         OB History    Para Term  AB Living   2 1 1   1     SAB TAB Ectopic Multiple Live Births   1              # Outcome Date GA Lbr Grady/2nd Weight Sex Delivery Anes PTL Lv   2 Term 03 39w0d  3.685 kg (8 lb 2 oz) M CS-Unspec      1 SAB               Obstetric Comments   Menarche at 13       No LMP recorded. Patient has had a hysterectomy.   Date of Last Pap: No result found    Review of Systems  Review of Systems   Constitutional: Negative for fatigue and unexpected weight change.   Respiratory: Negative for shortness of breath.    Cardiovascular: Negative for chest pain.   Gastrointestinal: Negative for abdominal pain, constipation, diarrhea, nausea and vomiting.   Genitourinary: Negative for dysuria.   Musculoskeletal: Negative for back pain.   Skin: Negative for rash.   Neurological: Negative for headaches.   Hematological: Does not bruise/bleed easily.   Psychiatric/Behavioral: Negative for behavioral problems.        Objective:   Physical Exam:   Constitutional: She is  oriented to person, place, and time. She appears well-developed and well-nourished. No distress.        Pulmonary/Chest: She exhibits no mass. Right breast exhibits no mass, no nipple discharge, no skin change, no tenderness, no bleeding and no swelling. Left breast exhibits no mass, no nipple discharge, no skin change, no tenderness, no bleeding and no swelling. Breasts are symmetrical.        Abdominal: Soft. Normal appearance and bowel sounds are normal. She exhibits no distension and no mass. There is no abdominal tenderness. There is no rebound.     Genitourinary:    Vagina normal.   There is no rash, tenderness, lesion or injury on the right labia. There is no rash, tenderness, lesion or injury on the left labia. Uterus is absent. Right adnexum displays no mass, no tenderness and no fullness. Left adnexum displays no mass, no tenderness and no fullness. No erythema, tenderness, rectocele, cystocele or unspecified prolapse of vaginal walls in the vagina. Cervix exhibits absence. negative for vaginal discharge          Musculoskeletal: Normal range of motion and moves all extremeties.      Lymphadenopathy:        Right: No supraclavicular adenopathy present.        Left: No supraclavicular adenopathy present.    Neurological: She is alert and oriented to person, place, and time.    Skin: Skin is warm and dry.    Psychiatric: She has a normal mood and affect. Her behavior is normal. Judgment normal.        Assessment/ Plan:     1. Encounter for gynecological examination (general) (routine) without abnormal findings         Follow up in about 1 year (around 9/3/2021) for Annual exam.

## 2021-03-22 ENCOUNTER — OFFICE VISIT (OUTPATIENT)
Dept: INTERNAL MEDICINE | Facility: CLINIC | Age: 41
End: 2021-03-22
Payer: COMMERCIAL

## 2021-03-22 VITALS
RESPIRATION RATE: 18 BRPM | BODY MASS INDEX: 30.27 KG/M2 | OXYGEN SATURATION: 100 % | HEART RATE: 84 BPM | WEIGHT: 181.69 LBS | SYSTOLIC BLOOD PRESSURE: 163 MMHG | DIASTOLIC BLOOD PRESSURE: 94 MMHG | HEIGHT: 65 IN

## 2021-03-22 DIAGNOSIS — R22.2 ABDOMINAL WALL LUMP: Primary | ICD-10-CM

## 2021-03-22 PROCEDURE — 3008F BODY MASS INDEX DOCD: CPT | Mod: CPTII,S$GLB,, | Performed by: FAMILY MEDICINE

## 2021-03-22 PROCEDURE — 99213 PR OFFICE/OUTPT VISIT, EST, LEVL III, 20-29 MIN: ICD-10-PCS | Mod: S$GLB,,, | Performed by: FAMILY MEDICINE

## 2021-03-22 PROCEDURE — 1126F PR PAIN SEVERITY QUANTIFIED, NO PAIN PRESENT: ICD-10-PCS | Mod: S$GLB,,, | Performed by: FAMILY MEDICINE

## 2021-03-22 PROCEDURE — 1126F AMNT PAIN NOTED NONE PRSNT: CPT | Mod: S$GLB,,, | Performed by: FAMILY MEDICINE

## 2021-03-22 PROCEDURE — 99999 PR PBB SHADOW E&M-EST. PATIENT-LVL III: CPT | Mod: PBBFAC,,, | Performed by: FAMILY MEDICINE

## 2021-03-22 PROCEDURE — 3008F PR BODY MASS INDEX (BMI) DOCUMENTED: ICD-10-PCS | Mod: CPTII,S$GLB,, | Performed by: FAMILY MEDICINE

## 2021-03-22 PROCEDURE — 99999 PR PBB SHADOW E&M-EST. PATIENT-LVL III: ICD-10-PCS | Mod: PBBFAC,,, | Performed by: FAMILY MEDICINE

## 2021-03-22 PROCEDURE — 99213 OFFICE O/P EST LOW 20 MIN: CPT | Mod: S$GLB,,, | Performed by: FAMILY MEDICINE

## 2021-03-24 ENCOUNTER — HOSPITAL ENCOUNTER (OUTPATIENT)
Dept: RADIOLOGY | Facility: OTHER | Age: 41
Discharge: HOME | End: 2021-03-24
Attending: FAMILY MEDICINE
Payer: COMMERCIAL

## 2021-03-24 DIAGNOSIS — R22.2 ABDOMINAL WALL LUMP: ICD-10-CM

## 2021-03-24 PROCEDURE — 76705 ECHO EXAM OF ABDOMEN: CPT | Mod: TC

## 2021-03-24 PROCEDURE — 76705 ECHO EXAM OF ABDOMEN: CPT | Mod: 26,,, | Performed by: RADIOLOGY

## 2021-03-24 PROCEDURE — 76705 US SOFT TISSUE, ABDOMEN: ICD-10-PCS | Mod: 26,,, | Performed by: RADIOLOGY

## 2021-04-15 ENCOUNTER — PATIENT MESSAGE (OUTPATIENT)
Dept: RESEARCH | Facility: HOSPITAL | Age: 41
End: 2021-04-15

## 2021-07-09 ENCOUNTER — TELEPHONE (OUTPATIENT)
Dept: OBSTETRICS AND GYNECOLOGY | Facility: CLINIC | Age: 41
End: 2021-07-09

## 2021-07-09 DIAGNOSIS — Z12.31 VISIT FOR SCREENING MAMMOGRAM: Primary | ICD-10-CM

## 2021-09-05 ENCOUNTER — PATIENT MESSAGE (OUTPATIENT)
Dept: OBSTETRICS AND GYNECOLOGY | Facility: CLINIC | Age: 41
End: 2021-09-05

## 2021-10-13 ENCOUNTER — PATIENT OUTREACH (OUTPATIENT)
Dept: ADMINISTRATIVE | Facility: OTHER | Age: 41
End: 2021-10-13

## 2021-10-14 ENCOUNTER — OFFICE VISIT (OUTPATIENT)
Dept: OBSTETRICS AND GYNECOLOGY | Facility: CLINIC | Age: 41
End: 2021-10-14
Attending: OBSTETRICS & GYNECOLOGY
Payer: COMMERCIAL

## 2021-10-14 ENCOUNTER — HOSPITAL ENCOUNTER (OUTPATIENT)
Dept: RADIOLOGY | Facility: OTHER | Age: 41
Discharge: HOME | End: 2021-10-14
Attending: OBSTETRICS & GYNECOLOGY
Payer: COMMERCIAL

## 2021-10-14 VITALS
DIASTOLIC BLOOD PRESSURE: 82 MMHG | SYSTOLIC BLOOD PRESSURE: 118 MMHG | HEIGHT: 65 IN | BODY MASS INDEX: 27.07 KG/M2 | WEIGHT: 162.5 LBS

## 2021-10-14 DIAGNOSIS — Z12.31 VISIT FOR SCREENING MAMMOGRAM: ICD-10-CM

## 2021-10-14 DIAGNOSIS — Z01.419 ENCOUNTER FOR GYNECOLOGICAL EXAMINATION: Primary | ICD-10-CM

## 2021-10-14 PROCEDURE — 77063 BREAST TOMOSYNTHESIS BI: CPT | Mod: 26,,, | Performed by: RADIOLOGY

## 2021-10-14 PROCEDURE — 3074F PR MOST RECENT SYSTOLIC BLOOD PRESSURE < 130 MM HG: ICD-10-PCS | Mod: CPTII,S$GLB,, | Performed by: OBSTETRICS & GYNECOLOGY

## 2021-10-14 PROCEDURE — 1160F RVW MEDS BY RX/DR IN RCRD: CPT | Mod: CPTII,S$GLB,, | Performed by: OBSTETRICS & GYNECOLOGY

## 2021-10-14 PROCEDURE — 99396 PR PREVENTIVE VISIT,EST,40-64: ICD-10-PCS | Mod: S$GLB,,, | Performed by: OBSTETRICS & GYNECOLOGY

## 2021-10-14 PROCEDURE — 3074F SYST BP LT 130 MM HG: CPT | Mod: CPTII,S$GLB,, | Performed by: OBSTETRICS & GYNECOLOGY

## 2021-10-14 PROCEDURE — 77067 SCR MAMMO BI INCL CAD: CPT | Mod: 26,,, | Performed by: RADIOLOGY

## 2021-10-14 PROCEDURE — 1159F PR MEDICATION LIST DOCUMENTED IN MEDICAL RECORD: ICD-10-PCS | Mod: CPTII,S$GLB,, | Performed by: OBSTETRICS & GYNECOLOGY

## 2021-10-14 PROCEDURE — 99999 PR PBB SHADOW E&M-EST. PATIENT-LVL III: CPT | Mod: PBBFAC,,, | Performed by: OBSTETRICS & GYNECOLOGY

## 2021-10-14 PROCEDURE — 3079F PR MOST RECENT DIASTOLIC BLOOD PRESSURE 80-89 MM HG: ICD-10-PCS | Mod: CPTII,S$GLB,, | Performed by: OBSTETRICS & GYNECOLOGY

## 2021-10-14 PROCEDURE — 3079F DIAST BP 80-89 MM HG: CPT | Mod: CPTII,S$GLB,, | Performed by: OBSTETRICS & GYNECOLOGY

## 2021-10-14 PROCEDURE — 77063 MAMMO DIGITAL SCREENING BILAT WITH TOMO: ICD-10-PCS | Mod: 26,,, | Performed by: RADIOLOGY

## 2021-10-14 PROCEDURE — 1159F MED LIST DOCD IN RCRD: CPT | Mod: CPTII,S$GLB,, | Performed by: OBSTETRICS & GYNECOLOGY

## 2021-10-14 PROCEDURE — 1160F PR REVIEW ALL MEDS BY PRESCRIBER/CLIN PHARMACIST DOCUMENTED: ICD-10-PCS | Mod: CPTII,S$GLB,, | Performed by: OBSTETRICS & GYNECOLOGY

## 2021-10-14 PROCEDURE — 77067 MAMMO DIGITAL SCREENING BILAT WITH TOMO: ICD-10-PCS | Mod: 26,,, | Performed by: RADIOLOGY

## 2021-10-14 PROCEDURE — 3008F BODY MASS INDEX DOCD: CPT | Mod: CPTII,S$GLB,, | Performed by: OBSTETRICS & GYNECOLOGY

## 2021-10-14 PROCEDURE — 3008F PR BODY MASS INDEX (BMI) DOCUMENTED: ICD-10-PCS | Mod: CPTII,S$GLB,, | Performed by: OBSTETRICS & GYNECOLOGY

## 2021-10-14 PROCEDURE — 99396 PREV VISIT EST AGE 40-64: CPT | Mod: S$GLB,,, | Performed by: OBSTETRICS & GYNECOLOGY

## 2021-10-14 PROCEDURE — 99999 PR PBB SHADOW E&M-EST. PATIENT-LVL III: ICD-10-PCS | Mod: PBBFAC,,, | Performed by: OBSTETRICS & GYNECOLOGY

## 2021-10-14 PROCEDURE — 77067 SCR MAMMO BI INCL CAD: CPT | Mod: TC

## 2022-01-24 ENCOUNTER — OFFICE VISIT (OUTPATIENT)
Dept: FAMILY MEDICINE | Facility: CLINIC | Age: 42
End: 2022-01-24
Payer: COMMERCIAL

## 2022-01-24 VITALS
BODY MASS INDEX: 27.68 KG/M2 | TEMPERATURE: 98 F | WEIGHT: 166.13 LBS | OXYGEN SATURATION: 99 % | SYSTOLIC BLOOD PRESSURE: 132 MMHG | HEIGHT: 65 IN | DIASTOLIC BLOOD PRESSURE: 80 MMHG | HEART RATE: 67 BPM

## 2022-01-24 DIAGNOSIS — Z00.00 WELLNESS EXAMINATION: ICD-10-CM

## 2022-01-24 DIAGNOSIS — Z00.00 ENCOUNTER FOR MEDICAL EXAMINATION TO ESTABLISH CARE: Primary | ICD-10-CM

## 2022-01-24 DIAGNOSIS — Z23 NEED FOR DIPHTHERIA-TETANUS-PERTUSSIS (TDAP) VACCINE: ICD-10-CM

## 2022-01-24 PROCEDURE — 90715 TDAP VACCINE GREATER THAN OR EQUAL TO 7YO IM: ICD-10-PCS | Mod: S$GLB,,, | Performed by: FAMILY MEDICINE

## 2022-01-24 PROCEDURE — 90471 IMMUNIZATION ADMIN: CPT | Mod: S$GLB,,, | Performed by: FAMILY MEDICINE

## 2022-01-24 PROCEDURE — 3075F SYST BP GE 130 - 139MM HG: CPT | Mod: CPTII,S$GLB,, | Performed by: FAMILY MEDICINE

## 2022-01-24 PROCEDURE — 1159F MED LIST DOCD IN RCRD: CPT | Mod: CPTII,S$GLB,, | Performed by: FAMILY MEDICINE

## 2022-01-24 PROCEDURE — 3008F BODY MASS INDEX DOCD: CPT | Mod: CPTII,S$GLB,, | Performed by: FAMILY MEDICINE

## 2022-01-24 PROCEDURE — 90715 TDAP VACCINE 7 YRS/> IM: CPT | Mod: S$GLB,,, | Performed by: FAMILY MEDICINE

## 2022-01-24 PROCEDURE — 99999 PR PBB SHADOW E&M-EST. PATIENT-LVL III: CPT | Mod: PBBFAC,,, | Performed by: FAMILY MEDICINE

## 2022-01-24 PROCEDURE — 3008F PR BODY MASS INDEX (BMI) DOCUMENTED: ICD-10-PCS | Mod: CPTII,S$GLB,, | Performed by: FAMILY MEDICINE

## 2022-01-24 PROCEDURE — 3075F PR MOST RECENT SYSTOLIC BLOOD PRESS GE 130-139MM HG: ICD-10-PCS | Mod: CPTII,S$GLB,, | Performed by: FAMILY MEDICINE

## 2022-01-24 PROCEDURE — 3079F DIAST BP 80-89 MM HG: CPT | Mod: CPTII,S$GLB,, | Performed by: FAMILY MEDICINE

## 2022-01-24 PROCEDURE — 90471 TDAP VACCINE GREATER THAN OR EQUAL TO 7YO IM: ICD-10-PCS | Mod: S$GLB,,, | Performed by: FAMILY MEDICINE

## 2022-01-24 PROCEDURE — 1159F PR MEDICATION LIST DOCUMENTED IN MEDICAL RECORD: ICD-10-PCS | Mod: CPTII,S$GLB,, | Performed by: FAMILY MEDICINE

## 2022-01-24 PROCEDURE — 3079F PR MOST RECENT DIASTOLIC BLOOD PRESSURE 80-89 MM HG: ICD-10-PCS | Mod: CPTII,S$GLB,, | Performed by: FAMILY MEDICINE

## 2022-01-24 PROCEDURE — 99999 PR PBB SHADOW E&M-EST. PATIENT-LVL III: ICD-10-PCS | Mod: PBBFAC,,, | Performed by: FAMILY MEDICINE

## 2022-01-24 PROCEDURE — 99396 PR PREVENTIVE VISIT,EST,40-64: ICD-10-PCS | Mod: 25,S$GLB,, | Performed by: FAMILY MEDICINE

## 2022-01-24 PROCEDURE — 99396 PREV VISIT EST AGE 40-64: CPT | Mod: 25,S$GLB,, | Performed by: FAMILY MEDICINE

## 2022-01-24 RX ORDER — ASCORBIC ACID 125 MG
1 TABLET,CHEWABLE ORAL DAILY
COMMUNITY
Start: 2022-01-24 | End: 2022-10-28

## 2022-01-24 NOTE — PROGRESS NOTES
Assessment & Plan  Encounter for medical examination to establish care    Wellness examination  -     CBC Auto Differential; Future; Expected date: 2022  -     Comprehensive Metabolic Panel; Future; Expected date: 2022  -     Hemoglobin A1C; Future; Expected date: 2022  -     Lipid Panel; Future; Expected date: 2022  -     HIV 1/2 Ag/Ab (4th Gen); Future; Expected date: 2022  -     Hepatitis C Antibody; Future; Expected date: 2022    Need for diphtheria-tetanus-pertussis (Tdap) vaccine  -     (In Office Administered) Tdap Vaccine        Health maintenance reviewed:  -Encouraged COVID booster    Follow-up: Follow up in about 1 year (around 2023).  ______________________________________________________________________    Chief Complaint  Chief Complaint   Patient presents with    Annual Exam    Establish Care       HPI  Maria Alejandra Perez is a 41 y.o. female with medical diagnoses as listed in the medical history and problem list that presents to the office to establish care and for her annual exam. She is from this area and works as a . She is in her usual state of health today.     Health Maintenance       Date Due Completion Date    Hepatitis C Screening Never done ---    HIV Screening Never done ---    TETANUS VACCINE Never done ---    COVID-19 Vaccine (3 - Booster for Pfizer series) 2022    Mammogram 10/14/2022 10/14/2021            PAST MEDICAL HISTORY:  Past Medical History:   Diagnosis Date    Acne 10/1/2018    Resolved through oral and topical medications    Fibroids     Right arm fracture        PAST SURGICAL HISTORY:  Past Surgical History:   Procedure Laterality Date     SECTION      HYSTERECTOMY      [facility], no BSO for fibroids. Dr. Metcalf    LEFT THUMB SURGERY      right arm surgery prior fracture         SOCIAL HISTORY:  Social History     Socioeconomic History    Marital status:    Tobacco Use     Smoking status: Former Smoker     Packs/day: 1.00     Years: 10.00     Pack years: 10.00     Types: Cigarettes    Smokeless tobacco: Never Used   Substance and Sexual Activity    Alcohol use: Yes     Alcohol/week: 0.0 standard drinks     Comment: social    Drug use: No    Sexual activity: Yes     Partners: Male     Birth control/protection: See Surgical Hx, None     Comment:         FAMILY HISTORY:  Family History   Problem Relation Age of Onset    Hypertension Father     Cancer Maternal Grandmother         esophageal cancer     Cancer Maternal Grandfather     Breast cancer Neg Hx     Colon cancer Neg Hx     Ovarian cancer Neg Hx        ALLERGIES AND MEDICATIONS: updated and reviewed.  Review of patient's allergies indicates:  No Known Allergies  Current Outpatient Medications   Medication Sig Dispense Refill    BIOTIN ORAL Take 5,000 Units by mouth Daily.      melatonin 5 mg Chew Take 1 each by mouth Daily.       No current facility-administered medications for this visit.         ROS  Review of Systems   Constitutional: Negative for activity change, fever and unexpected weight change.   HENT: Negative for congestion and sore throat.    Eyes: Negative for photophobia and visual disturbance.   Respiratory: Negative for cough and shortness of breath.    Cardiovascular: Negative for chest pain and leg swelling.   Gastrointestinal: Negative for abdominal pain, constipation, diarrhea, nausea and vomiting.   Endocrine: Negative for polydipsia, polyphagia and polyuria.   Genitourinary: Negative for dysuria and urgency.   Musculoskeletal: Negative for arthralgias and gait problem.   Skin: Negative for color change.   Neurological: Negative for dizziness, weakness and headaches.   Psychiatric/Behavioral: Negative for dysphoric mood and sleep disturbance. The patient is not nervous/anxious.            Physical Exam  Vitals:    01/24/22 1107   BP: 132/80   BP Location: Left arm   Patient Position: Sitting  "  BP Method: Medium (Manual)   Pulse: 67   Temp: 98.2 °F (36.8 °C)   TempSrc: Oral   SpO2: 99%   Weight: 75.4 kg (166 lb 1.9 oz)   Height: 5' 5" (1.651 m)    Body mass index is 27.64 kg/m².  Weight: 75.4 kg (166 lb 1.9 oz)   Height: 5' 5" (165.1 cm)   Physical Exam  Constitutional:       General: She is not in acute distress.  HENT:      Head: Normocephalic and atraumatic.      Right Ear: Tympanic membrane and ear canal normal.      Left Ear: Tympanic membrane and ear canal normal.      Nose: Nose normal.      Mouth/Throat:      Mouth: Mucous membranes are moist.      Pharynx: Oropharynx is clear.   Eyes:      Conjunctiva/sclera: Conjunctivae normal.      Pupils: Pupils are equal, round, and reactive to light.   Neck:      Thyroid: No thyromegaly.   Cardiovascular:      Rate and Rhythm: Normal rate and regular rhythm.      Pulses: Normal pulses.      Heart sounds: Normal heart sounds.   Pulmonary:      Effort: Pulmonary effort is normal. No respiratory distress.      Breath sounds: Normal breath sounds.   Musculoskeletal:      Cervical back: Neck supple.      Right lower leg: No edema.      Left lower leg: No edema.   Lymphadenopathy:      Cervical: No cervical adenopathy.   Skin:     General: Skin is warm and dry.      Findings: No rash.   Neurological:      General: No focal deficit present.      Mental Status: She is alert and oriented to person, place, and time.   Psychiatric:         Mood and Affect: Mood normal.         Behavior: Behavior normal.         Thought Content: Thought content normal.             "

## 2022-01-25 ENCOUNTER — LAB VISIT (OUTPATIENT)
Dept: LAB | Facility: HOSPITAL | Age: 42
End: 2022-01-25
Attending: FAMILY MEDICINE
Payer: COMMERCIAL

## 2022-01-25 ENCOUNTER — PATIENT MESSAGE (OUTPATIENT)
Dept: FAMILY MEDICINE | Facility: CLINIC | Age: 42
End: 2022-01-25
Payer: COMMERCIAL

## 2022-01-25 DIAGNOSIS — Z00.00 WELLNESS EXAMINATION: ICD-10-CM

## 2022-01-25 LAB
ALBUMIN SERPL BCP-MCNC: 4.6 G/DL (ref 3.5–5.2)
ALP SERPL-CCNC: 88 U/L (ref 55–135)
ALT SERPL W/O P-5'-P-CCNC: 32 U/L (ref 10–44)
ANION GAP SERPL CALC-SCNC: 9 MMOL/L (ref 8–16)
AST SERPL-CCNC: 27 U/L (ref 10–40)
BASOPHILS # BLD AUTO: 0.03 K/UL (ref 0–0.2)
BASOPHILS NFR BLD: 0.4 % (ref 0–1.9)
BILIRUB SERPL-MCNC: 0.6 MG/DL (ref 0.1–1)
BUN SERPL-MCNC: 14 MG/DL (ref 6–20)
CALCIUM SERPL-MCNC: 9.4 MG/DL (ref 8.7–10.5)
CHLORIDE SERPL-SCNC: 107 MMOL/L (ref 95–110)
CHOLEST SERPL-MCNC: 146 MG/DL (ref 120–199)
CHOLEST/HDLC SERPL: 2.4 {RATIO} (ref 2–5)
CO2 SERPL-SCNC: 23 MMOL/L (ref 23–29)
CREAT SERPL-MCNC: 0.7 MG/DL (ref 0.5–1.4)
DIFFERENTIAL METHOD: ABNORMAL
EOSINOPHIL # BLD AUTO: 0.1 K/UL (ref 0–0.5)
EOSINOPHIL NFR BLD: 1.6 % (ref 0–8)
ERYTHROCYTE [DISTWIDTH] IN BLOOD BY AUTOMATED COUNT: 11.8 % (ref 11.5–14.5)
EST. GFR  (AFRICAN AMERICAN): >60 ML/MIN/1.73 M^2
EST. GFR  (NON AFRICAN AMERICAN): >60 ML/MIN/1.73 M^2
ESTIMATED AVG GLUCOSE: 91 MG/DL (ref 68–131)
GLUCOSE SERPL-MCNC: 80 MG/DL (ref 70–110)
HBA1C MFR BLD: 4.8 % (ref 4–5.6)
HCT VFR BLD AUTO: 46 % (ref 37–48.5)
HCV AB SERPL QL IA: NEGATIVE
HDLC SERPL-MCNC: 60 MG/DL (ref 40–75)
HDLC SERPL: 41.1 % (ref 20–50)
HGB BLD-MCNC: 14.4 G/DL (ref 12–16)
HIV 1+2 AB+HIV1 P24 AG SERPL QL IA: NEGATIVE
IMM GRANULOCYTES # BLD AUTO: 0.03 K/UL (ref 0–0.04)
IMM GRANULOCYTES NFR BLD AUTO: 0.4 % (ref 0–0.5)
LDLC SERPL CALC-MCNC: 71.4 MG/DL (ref 63–159)
LYMPHOCYTES # BLD AUTO: 2.2 K/UL (ref 1–4.8)
LYMPHOCYTES NFR BLD: 32 % (ref 18–48)
MCH RBC QN AUTO: 31.2 PG (ref 27–31)
MCHC RBC AUTO-ENTMCNC: 31.3 G/DL (ref 32–36)
MCV RBC AUTO: 100 FL (ref 82–98)
MONOCYTES # BLD AUTO: 0.4 K/UL (ref 0.3–1)
MONOCYTES NFR BLD: 6.5 % (ref 4–15)
NEUTROPHILS # BLD AUTO: 4 K/UL (ref 1.8–7.7)
NEUTROPHILS NFR BLD: 59.1 % (ref 38–73)
NONHDLC SERPL-MCNC: 86 MG/DL
NRBC BLD-RTO: 0 /100 WBC
PLATELET # BLD AUTO: 195 K/UL (ref 150–450)
PMV BLD AUTO: 11 FL (ref 9.2–12.9)
POTASSIUM SERPL-SCNC: 4.4 MMOL/L (ref 3.5–5.1)
PROT SERPL-MCNC: 7.6 G/DL (ref 6–8.4)
RBC # BLD AUTO: 4.62 M/UL (ref 4–5.4)
SODIUM SERPL-SCNC: 139 MMOL/L (ref 136–145)
TRIGL SERPL-MCNC: 73 MG/DL (ref 30–150)
WBC # BLD AUTO: 6.79 K/UL (ref 3.9–12.7)

## 2022-01-25 PROCEDURE — 85025 COMPLETE CBC W/AUTO DIFF WBC: CPT | Performed by: FAMILY MEDICINE

## 2022-01-25 PROCEDURE — 80053 COMPREHEN METABOLIC PANEL: CPT | Performed by: FAMILY MEDICINE

## 2022-01-25 PROCEDURE — 86803 HEPATITIS C AB TEST: CPT | Performed by: FAMILY MEDICINE

## 2022-01-25 PROCEDURE — 83036 HEMOGLOBIN GLYCOSYLATED A1C: CPT | Performed by: FAMILY MEDICINE

## 2022-01-25 PROCEDURE — 87389 HIV-1 AG W/HIV-1&-2 AB AG IA: CPT | Performed by: FAMILY MEDICINE

## 2022-01-25 PROCEDURE — 80061 LIPID PANEL: CPT | Performed by: FAMILY MEDICINE

## 2022-01-25 PROCEDURE — 36415 COLL VENOUS BLD VENIPUNCTURE: CPT | Mod: PO | Performed by: FAMILY MEDICINE

## 2022-10-04 ENCOUNTER — OFFICE VISIT (OUTPATIENT)
Dept: FAMILY MEDICINE | Facility: CLINIC | Age: 42
End: 2022-10-04
Payer: COMMERCIAL

## 2022-10-04 VITALS
BODY MASS INDEX: 29.29 KG/M2 | SYSTOLIC BLOOD PRESSURE: 130 MMHG | HEIGHT: 65 IN | HEART RATE: 85 BPM | WEIGHT: 175.81 LBS | DIASTOLIC BLOOD PRESSURE: 76 MMHG | OXYGEN SATURATION: 99 % | TEMPERATURE: 99 F

## 2022-10-04 DIAGNOSIS — Z23 NEED FOR INFLUENZA VACCINATION: ICD-10-CM

## 2022-10-04 DIAGNOSIS — S16.1XXA STRAIN OF NECK MUSCLE, INITIAL ENCOUNTER: Primary | ICD-10-CM

## 2022-10-04 PROCEDURE — 90686 FLU VACCINE (QUAD) GREATER THAN OR EQUAL TO 3YO PRESERVATIVE FREE IM: ICD-10-PCS | Mod: S$GLB,,, | Performed by: FAMILY MEDICINE

## 2022-10-04 PROCEDURE — 90471 FLU VACCINE (QUAD) GREATER THAN OR EQUAL TO 3YO PRESERVATIVE FREE IM: ICD-10-PCS | Mod: S$GLB,,, | Performed by: FAMILY MEDICINE

## 2022-10-04 PROCEDURE — 3044F PR MOST RECENT HEMOGLOBIN A1C LEVEL <7.0%: ICD-10-PCS | Mod: CPTII,S$GLB,, | Performed by: FAMILY MEDICINE

## 2022-10-04 PROCEDURE — 99213 PR OFFICE/OUTPT VISIT, EST, LEVL III, 20-29 MIN: ICD-10-PCS | Mod: 25,S$GLB,, | Performed by: FAMILY MEDICINE

## 2022-10-04 PROCEDURE — 99213 OFFICE O/P EST LOW 20 MIN: CPT | Mod: 25,S$GLB,, | Performed by: FAMILY MEDICINE

## 2022-10-04 PROCEDURE — 90471 IMMUNIZATION ADMIN: CPT | Mod: S$GLB,,, | Performed by: FAMILY MEDICINE

## 2022-10-04 PROCEDURE — 3075F PR MOST RECENT SYSTOLIC BLOOD PRESS GE 130-139MM HG: ICD-10-PCS | Mod: CPTII,S$GLB,, | Performed by: FAMILY MEDICINE

## 2022-10-04 PROCEDURE — 1160F RVW MEDS BY RX/DR IN RCRD: CPT | Mod: CPTII,S$GLB,, | Performed by: FAMILY MEDICINE

## 2022-10-04 PROCEDURE — 3008F PR BODY MASS INDEX (BMI) DOCUMENTED: ICD-10-PCS | Mod: CPTII,S$GLB,, | Performed by: FAMILY MEDICINE

## 2022-10-04 PROCEDURE — 99999 PR PBB SHADOW E&M-EST. PATIENT-LVL III: ICD-10-PCS | Mod: PBBFAC,,, | Performed by: FAMILY MEDICINE

## 2022-10-04 PROCEDURE — 90686 IIV4 VACC NO PRSV 0.5 ML IM: CPT | Mod: S$GLB,,, | Performed by: FAMILY MEDICINE

## 2022-10-04 PROCEDURE — 3008F BODY MASS INDEX DOCD: CPT | Mod: CPTII,S$GLB,, | Performed by: FAMILY MEDICINE

## 2022-10-04 PROCEDURE — 1159F MED LIST DOCD IN RCRD: CPT | Mod: CPTII,S$GLB,, | Performed by: FAMILY MEDICINE

## 2022-10-04 PROCEDURE — 1159F PR MEDICATION LIST DOCUMENTED IN MEDICAL RECORD: ICD-10-PCS | Mod: CPTII,S$GLB,, | Performed by: FAMILY MEDICINE

## 2022-10-04 PROCEDURE — 1160F PR REVIEW ALL MEDS BY PRESCRIBER/CLIN PHARMACIST DOCUMENTED: ICD-10-PCS | Mod: CPTII,S$GLB,, | Performed by: FAMILY MEDICINE

## 2022-10-04 PROCEDURE — 3044F HG A1C LEVEL LT 7.0%: CPT | Mod: CPTII,S$GLB,, | Performed by: FAMILY MEDICINE

## 2022-10-04 PROCEDURE — 3078F PR MOST RECENT DIASTOLIC BLOOD PRESSURE < 80 MM HG: ICD-10-PCS | Mod: CPTII,S$GLB,, | Performed by: FAMILY MEDICINE

## 2022-10-04 PROCEDURE — 3078F DIAST BP <80 MM HG: CPT | Mod: CPTII,S$GLB,, | Performed by: FAMILY MEDICINE

## 2022-10-04 PROCEDURE — 99999 PR PBB SHADOW E&M-EST. PATIENT-LVL III: CPT | Mod: PBBFAC,,, | Performed by: FAMILY MEDICINE

## 2022-10-04 PROCEDURE — 3075F SYST BP GE 130 - 139MM HG: CPT | Mod: CPTII,S$GLB,, | Performed by: FAMILY MEDICINE

## 2022-10-04 RX ORDER — TIZANIDINE 2 MG/1
TABLET ORAL
Qty: 60 TABLET | Refills: 2 | Status: SHIPPED | OUTPATIENT
Start: 2022-10-04

## 2022-10-04 RX ORDER — DICLOFENAC SODIUM 10 MG/G
2 GEL TOPICAL DAILY
Qty: 100 G | Refills: 1 | Status: SHIPPED | OUTPATIENT
Start: 2022-10-04

## 2022-10-04 NOTE — PROGRESS NOTES
Patient given flu vaccine via injection. 0 complaints, tolerated well. VIS given & advised to wait in lobby 15 mins for monitoring. Verbalized understanding.

## 2022-10-26 DIAGNOSIS — Z12.31 OTHER SCREENING MAMMOGRAM: ICD-10-CM

## 2022-10-28 ENCOUNTER — OFFICE VISIT (OUTPATIENT)
Dept: OBSTETRICS AND GYNECOLOGY | Facility: CLINIC | Age: 42
End: 2022-10-28
Attending: OBSTETRICS & GYNECOLOGY
Payer: COMMERCIAL

## 2022-10-28 VITALS — BODY MASS INDEX: 29.38 KG/M2 | HEIGHT: 65 IN | WEIGHT: 176.38 LBS

## 2022-10-28 DIAGNOSIS — R10.2 PELVIC PAIN: ICD-10-CM

## 2022-10-28 DIAGNOSIS — Z01.419 ENCOUNTER FOR GYNECOLOGICAL EXAMINATION: Primary | ICD-10-CM

## 2022-10-28 DIAGNOSIS — Z01.411 ENCOUNTER FOR GYNECOLOGICAL EXAMINATION (GENERAL) (ROUTINE) WITH ABNORMAL FINDINGS: ICD-10-CM

## 2022-10-28 PROCEDURE — 3044F PR MOST RECENT HEMOGLOBIN A1C LEVEL <7.0%: ICD-10-PCS | Mod: CPTII,S$GLB,, | Performed by: OBSTETRICS & GYNECOLOGY

## 2022-10-28 PROCEDURE — 3044F HG A1C LEVEL LT 7.0%: CPT | Mod: CPTII,S$GLB,, | Performed by: OBSTETRICS & GYNECOLOGY

## 2022-10-28 PROCEDURE — 1159F PR MEDICATION LIST DOCUMENTED IN MEDICAL RECORD: ICD-10-PCS | Mod: CPTII,S$GLB,, | Performed by: OBSTETRICS & GYNECOLOGY

## 2022-10-28 PROCEDURE — 99396 PR PREVENTIVE VISIT,EST,40-64: ICD-10-PCS | Mod: S$GLB,,, | Performed by: OBSTETRICS & GYNECOLOGY

## 2022-10-28 PROCEDURE — 99999 PR PBB SHADOW E&M-EST. PATIENT-LVL III: CPT | Mod: PBBFAC,,, | Performed by: OBSTETRICS & GYNECOLOGY

## 2022-10-28 PROCEDURE — 1159F MED LIST DOCD IN RCRD: CPT | Mod: CPTII,S$GLB,, | Performed by: OBSTETRICS & GYNECOLOGY

## 2022-10-28 PROCEDURE — 99396 PREV VISIT EST AGE 40-64: CPT | Mod: S$GLB,,, | Performed by: OBSTETRICS & GYNECOLOGY

## 2022-10-28 PROCEDURE — 99999 PR PBB SHADOW E&M-EST. PATIENT-LVL III: ICD-10-PCS | Mod: PBBFAC,,, | Performed by: OBSTETRICS & GYNECOLOGY

## 2022-10-28 NOTE — PROGRESS NOTES
Subjective:       Patient ID: Maria Alejandra Perez is a 42 y.o. female.    Chief Complaint:  Annual Exam (Mammogram scheduled 11-15-22)        History of Present Illness  Maria Alejandra Perez is a 42 y.o. female  who presents for annual. Had DVH in  done by myself. Ovaries were left in. She says about every 3 months for the last 5 years she has 8 days of intermittent lower abdominal cramping that feels like what a uterus would feel like. Feels like it is cyclic and related to GYN issues. We discussed in detail. I think it sounds more GI related. Will order GYN u/s to check ovaries  and if that is normal consider GI referral. Says it is more in RLQ area. No fever, no blood. Discussed x 15 minutes. Patient tearful. Feels like it is hormonal and frustrated that she is unable to track her cycles.     No LMP recorded. Patient has had a hysterectomy.   Date of Last Pap: No result found    Review of Systems  Review of Systems   Constitutional:  Negative for chills and fever.      Objective:   Physical Exam:   Constitutional: She is oriented to person, place, and time. Vital signs are normal. She appears well-developed and well-nourished. No distress.        Pulmonary/Chest: She exhibits no mass. Right breast exhibits no mass, no nipple discharge, no skin change, no tenderness, no bleeding and no swelling. Left breast exhibits no mass, no nipple discharge, no skin change, no tenderness, no bleeding and no swelling. Breasts are symmetrical.        Abdominal: Soft. Bowel sounds are normal. She exhibits no distension and no mass. There is no abdominal tenderness. There is no rebound.     Genitourinary:    Vagina normal.   There is no rash, tenderness, lesion or injury on the right labia. There is no rash, tenderness, lesion or injury on the left labia. Right adnexum displays no mass, no tenderness and no fullness. Left adnexum displays no mass, no tenderness and no fullness. No erythema,  no vaginal discharge, tenderness,  rectocele, cystocele or unspecified prolapse of vaginal walls in the vagina. Cervix is absent.Uterus is absent.    Genitourinary Comments: Fullness in RLQ, does not feel like ovary though.              Musculoskeletal: Normal range of motion and moves all extremeties.      Lymphadenopathy:        Right: No supraclavicular adenopathy present.        Left: No supraclavicular adenopathy present.    Neurological: She is alert and oriented to person, place, and time.    Skin: Skin is warm and dry.    Psychiatric: She has a normal mood and affect. Her behavior is normal. Judgment normal.      Assessment/ Plan:     1. Encounter for gynecological examination        2. Pelvic pain  US Pelvis Comp with Transvag NON-OB (xpd    US Pelvis Comp with Transvag NON-OB (xpd      3. Encounter for gynecological examination (general) (routine) with abnormal findings            Follow up if symptoms worsen or fail to improve.    As of April 1, 2021, the Cures Act has been passed nationally. This new law requires that all doctors progress notes, lab results, pathology reports and radiology reports be released IMMEDIATELY to the patient in the patient portal. That means that the results are released to you at the EXACT same time they are released to me. Therefore, with all of the patients that I have I am not able to reply to each patient exactly when the results come in. So there will be a delay from when you see the results to when I see them and have time to come up with a response to send you. Also I only see these results when I am on the computer at work. So if the results come in over the weekend or after 5 pm of a work day, I will not see them until the next business day. As you can tell, this is a challenge as a physician to give every patient the quick response they hope for and deserve. So please be patient! Thanks for understanding, Dr. Metcalf

## 2022-10-31 ENCOUNTER — PATIENT MESSAGE (OUTPATIENT)
Dept: ADMINISTRATIVE | Facility: HOSPITAL | Age: 42
End: 2022-10-31
Payer: COMMERCIAL

## 2022-10-31 ENCOUNTER — HOSPITAL ENCOUNTER (OUTPATIENT)
Dept: RADIOLOGY | Facility: HOSPITAL | Age: 42
Discharge: HOME | End: 2022-10-31
Attending: OBSTETRICS & GYNECOLOGY
Payer: COMMERCIAL

## 2022-10-31 DIAGNOSIS — R10.2 PELVIC PAIN: ICD-10-CM

## 2022-10-31 PROCEDURE — 76856 US EXAM PELVIC COMPLETE: CPT | Mod: 26,,, | Performed by: INTERNAL MEDICINE

## 2022-10-31 PROCEDURE — 76830 TRANSVAGINAL US NON-OB: CPT | Mod: TC

## 2022-10-31 PROCEDURE — 76856 US PELVIS COMP WITH TRANSVAG NON-OB (XPD): ICD-10-PCS | Mod: 26,,, | Performed by: INTERNAL MEDICINE

## 2022-10-31 PROCEDURE — 76830 TRANSVAGINAL US NON-OB: CPT | Mod: 26,,, | Performed by: INTERNAL MEDICINE

## 2022-10-31 PROCEDURE — 76830 US PELVIS COMP WITH TRANSVAG NON-OB (XPD): ICD-10-PCS | Mod: 26,,, | Performed by: INTERNAL MEDICINE

## 2022-11-15 ENCOUNTER — HOSPITAL ENCOUNTER (OUTPATIENT)
Dept: RADIOLOGY | Facility: HOSPITAL | Age: 42
Discharge: HOME | End: 2022-11-15
Attending: FAMILY MEDICINE
Payer: COMMERCIAL

## 2022-11-15 DIAGNOSIS — Z12.31 OTHER SCREENING MAMMOGRAM: ICD-10-CM

## 2022-11-15 PROCEDURE — 77063 MAMMO DIGITAL SCREENING BILAT WITH TOMO: ICD-10-PCS | Mod: 26,,, | Performed by: RADIOLOGY

## 2022-11-15 PROCEDURE — 77063 BREAST TOMOSYNTHESIS BI: CPT | Mod: 26,,, | Performed by: RADIOLOGY

## 2022-11-15 PROCEDURE — 77067 MAMMO DIGITAL SCREENING BILAT WITH TOMO: ICD-10-PCS | Mod: 26,,, | Performed by: RADIOLOGY

## 2022-11-15 PROCEDURE — 77063 BREAST TOMOSYNTHESIS BI: CPT | Mod: TC,PO

## 2022-11-15 PROCEDURE — 77067 SCR MAMMO BI INCL CAD: CPT | Mod: 26,,, | Performed by: RADIOLOGY

## 2022-11-17 ENCOUNTER — PATIENT MESSAGE (OUTPATIENT)
Dept: FAMILY MEDICINE | Facility: CLINIC | Age: 42
End: 2022-11-17
Payer: COMMERCIAL

## 2022-11-17 DIAGNOSIS — N64.89 BREAST ASYMMETRY: Primary | ICD-10-CM

## 2022-11-17 NOTE — TELEPHONE ENCOUNTER
Contacted patient to inform her of results and phone number has been provided to scheduled mammo.

## 2022-11-17 NOTE — TELEPHONE ENCOUNTER
Patient was informed of result(s) via [employer].  Please call patient to provide # to schedule diagnostic mammo/US.

## 2022-11-22 ENCOUNTER — HOSPITAL ENCOUNTER (OUTPATIENT)
Dept: RADIOLOGY | Facility: HOSPITAL | Age: 42
Discharge: HOME | End: 2022-11-22
Attending: FAMILY MEDICINE
Payer: COMMERCIAL

## 2022-11-22 DIAGNOSIS — N64.89 BREAST ASYMMETRY: ICD-10-CM

## 2022-11-22 PROCEDURE — 76642 ULTRASOUND BREAST LIMITED: CPT | Mod: 26,RT,, | Performed by: RADIOLOGY

## 2022-11-22 PROCEDURE — 76642 US BREAST RIGHT LIMITED: ICD-10-PCS | Mod: 26,RT,, | Performed by: RADIOLOGY

## 2022-11-22 PROCEDURE — 77065 DX MAMMO INCL CAD UNI: CPT | Mod: 26,RT,, | Performed by: RADIOLOGY

## 2022-11-22 PROCEDURE — 77065 MAMMO DIGITAL DIAGNOSTIC RIGHT: ICD-10-PCS | Mod: 26,RT,, | Performed by: RADIOLOGY

## 2022-11-22 PROCEDURE — 77065 DX MAMMO INCL CAD UNI: CPT | Mod: TC,RT

## 2022-11-22 PROCEDURE — 76642 ULTRASOUND BREAST LIMITED: CPT | Mod: TC,RT

## 2023-03-06 ENCOUNTER — PATIENT MESSAGE (OUTPATIENT)
Dept: FAMILY MEDICINE | Facility: CLINIC | Age: 43
End: 2023-03-06
Payer: COMMERCIAL

## 2023-03-06 DIAGNOSIS — Z78.0 MENOPAUSE: Primary | ICD-10-CM

## 2023-03-06 DIAGNOSIS — Z00.00 ANNUAL PHYSICAL EXAM: ICD-10-CM

## 2023-03-11 ENCOUNTER — LAB VISIT (OUTPATIENT)
Dept: LAB | Facility: HOSPITAL | Age: 43
End: 2023-03-11
Attending: FAMILY MEDICINE
Payer: COMMERCIAL

## 2023-03-11 DIAGNOSIS — Z00.00 ANNUAL PHYSICAL EXAM: ICD-10-CM

## 2023-03-11 DIAGNOSIS — Z78.0 MENOPAUSE: ICD-10-CM

## 2023-03-11 LAB
ALBUMIN SERPL BCP-MCNC: 4 G/DL (ref 3.5–5.2)
ALP SERPL-CCNC: 80 U/L (ref 55–135)
ALT SERPL W/O P-5'-P-CCNC: 34 U/L (ref 10–44)
ANION GAP SERPL CALC-SCNC: 9 MMOL/L (ref 8–16)
AST SERPL-CCNC: 24 U/L (ref 10–40)
BASOPHILS # BLD AUTO: 0.04 K/UL (ref 0–0.2)
BASOPHILS NFR BLD: 0.7 % (ref 0–1.9)
BILIRUB SERPL-MCNC: 0.4 MG/DL (ref 0.1–1)
BUN SERPL-MCNC: 13 MG/DL (ref 6–20)
CALCIUM SERPL-MCNC: 8.9 MG/DL (ref 8.7–10.5)
CHLORIDE SERPL-SCNC: 107 MMOL/L (ref 95–110)
CHOLEST SERPL-MCNC: 158 MG/DL (ref 120–199)
CHOLEST/HDLC SERPL: 2.8 {RATIO} (ref 2–5)
CO2 SERPL-SCNC: 25 MMOL/L (ref 23–29)
CREAT SERPL-MCNC: 0.7 MG/DL (ref 0.5–1.4)
DIFFERENTIAL METHOD: ABNORMAL
EOSINOPHIL # BLD AUTO: 0.1 K/UL (ref 0–0.5)
EOSINOPHIL NFR BLD: 2.1 % (ref 0–8)
ERYTHROCYTE [DISTWIDTH] IN BLOOD BY AUTOMATED COUNT: 11.7 % (ref 11.5–14.5)
EST. GFR  (NO RACE VARIABLE): >60 ML/MIN/1.73 M^2
ESTIMATED AVG GLUCOSE: 88 MG/DL (ref 68–131)
ESTRADIOL SERPL-MCNC: 49 PG/ML
FSH SERPL-ACNC: 25.14 MIU/ML
GLUCOSE SERPL-MCNC: 90 MG/DL (ref 70–110)
HBA1C MFR BLD: 4.7 % (ref 4–5.6)
HCT VFR BLD AUTO: 42 % (ref 37–48.5)
HDLC SERPL-MCNC: 56 MG/DL (ref 40–75)
HDLC SERPL: 35.4 % (ref 20–50)
HGB BLD-MCNC: 14 G/DL (ref 12–16)
IMM GRANULOCYTES # BLD AUTO: 0.03 K/UL (ref 0–0.04)
IMM GRANULOCYTES NFR BLD AUTO: 0.5 % (ref 0–0.5)
LDLC SERPL CALC-MCNC: 90.2 MG/DL (ref 63–159)
LH SERPL-ACNC: 8.9 MIU/ML
LYMPHOCYTES # BLD AUTO: 2.2 K/UL (ref 1–4.8)
LYMPHOCYTES NFR BLD: 38.8 % (ref 18–48)
MCH RBC QN AUTO: 32 PG (ref 27–31)
MCHC RBC AUTO-ENTMCNC: 33.3 G/DL (ref 32–36)
MCV RBC AUTO: 96 FL (ref 82–98)
MONOCYTES # BLD AUTO: 0.4 K/UL (ref 0.3–1)
MONOCYTES NFR BLD: 7.9 % (ref 4–15)
NEUTROPHILS # BLD AUTO: 2.8 K/UL (ref 1.8–7.7)
NEUTROPHILS NFR BLD: 50 % (ref 38–73)
NONHDLC SERPL-MCNC: 102 MG/DL
NRBC BLD-RTO: 0 /100 WBC
PLATELET # BLD AUTO: 166 K/UL (ref 150–450)
PMV BLD AUTO: 10.4 FL (ref 9.2–12.9)
POTASSIUM SERPL-SCNC: 4.8 MMOL/L (ref 3.5–5.1)
PROT SERPL-MCNC: 6.9 G/DL (ref 6–8.4)
RBC # BLD AUTO: 4.37 M/UL (ref 4–5.4)
SODIUM SERPL-SCNC: 141 MMOL/L (ref 136–145)
TRIGL SERPL-MCNC: 59 MG/DL (ref 30–150)
TSH SERPL DL<=0.005 MIU/L-ACNC: 0.86 UIU/ML (ref 0.4–4)
WBC # BLD AUTO: 5.59 K/UL (ref 3.9–12.7)

## 2023-03-11 PROCEDURE — 36415 COLL VENOUS BLD VENIPUNCTURE: CPT | Performed by: FAMILY MEDICINE

## 2023-03-11 PROCEDURE — 84443 ASSAY THYROID STIM HORMONE: CPT | Performed by: FAMILY MEDICINE

## 2023-03-11 PROCEDURE — 83036 HEMOGLOBIN GLYCOSYLATED A1C: CPT | Performed by: FAMILY MEDICINE

## 2023-03-11 PROCEDURE — 85025 COMPLETE CBC W/AUTO DIFF WBC: CPT | Performed by: FAMILY MEDICINE

## 2023-03-11 PROCEDURE — 83001 ASSAY OF GONADOTROPIN (FSH): CPT | Performed by: FAMILY MEDICINE

## 2023-03-11 PROCEDURE — 80061 LIPID PANEL: CPT | Performed by: FAMILY MEDICINE

## 2023-03-11 PROCEDURE — 83002 ASSAY OF GONADOTROPIN (LH): CPT | Performed by: FAMILY MEDICINE

## 2023-03-11 PROCEDURE — 82670 ASSAY OF TOTAL ESTRADIOL: CPT | Performed by: FAMILY MEDICINE

## 2023-03-11 PROCEDURE — 80053 COMPREHEN METABOLIC PANEL: CPT | Performed by: FAMILY MEDICINE

## 2023-03-16 ENCOUNTER — OFFICE VISIT (OUTPATIENT)
Dept: FAMILY MEDICINE | Facility: CLINIC | Age: 43
End: 2023-03-16
Payer: COMMERCIAL

## 2023-03-16 VITALS
BODY MASS INDEX: 30.43 KG/M2 | HEART RATE: 77 BPM | SYSTOLIC BLOOD PRESSURE: 141 MMHG | TEMPERATURE: 98 F | WEIGHT: 182.63 LBS | HEIGHT: 65 IN | OXYGEN SATURATION: 99 % | DIASTOLIC BLOOD PRESSURE: 92 MMHG

## 2023-03-16 DIAGNOSIS — E28.319 EARLY MENOPAUSE OCCURRING IN PATIENT AGE YOUNGER THAN 45 YEARS: ICD-10-CM

## 2023-03-16 DIAGNOSIS — Z00.00 ANNUAL PHYSICAL EXAM: Primary | ICD-10-CM

## 2023-03-16 DIAGNOSIS — R03.0 ELEVATED BP WITHOUT DIAGNOSIS OF HYPERTENSION: ICD-10-CM

## 2023-03-16 PROCEDURE — 3044F PR MOST RECENT HEMOGLOBIN A1C LEVEL <7.0%: ICD-10-PCS | Mod: CPTII,S$GLB,, | Performed by: FAMILY MEDICINE

## 2023-03-16 PROCEDURE — 3077F SYST BP >= 140 MM HG: CPT | Mod: CPTII,S$GLB,, | Performed by: FAMILY MEDICINE

## 2023-03-16 PROCEDURE — 3008F BODY MASS INDEX DOCD: CPT | Mod: CPTII,S$GLB,, | Performed by: FAMILY MEDICINE

## 2023-03-16 PROCEDURE — 99999 PR PBB SHADOW E&M-EST. PATIENT-LVL IV: CPT | Mod: PBBFAC,,, | Performed by: FAMILY MEDICINE

## 2023-03-16 PROCEDURE — 3077F PR MOST RECENT SYSTOLIC BLOOD PRESSURE >= 140 MM HG: ICD-10-PCS | Mod: CPTII,S$GLB,, | Performed by: FAMILY MEDICINE

## 2023-03-16 PROCEDURE — 3080F PR MOST RECENT DIASTOLIC BLOOD PRESSURE >= 90 MM HG: ICD-10-PCS | Mod: CPTII,S$GLB,, | Performed by: FAMILY MEDICINE

## 2023-03-16 PROCEDURE — 99214 OFFICE O/P EST MOD 30 MIN: CPT | Mod: S$GLB,,, | Performed by: FAMILY MEDICINE

## 2023-03-16 PROCEDURE — 99214 PR OFFICE/OUTPT VISIT, EST, LEVL IV, 30-39 MIN: ICD-10-PCS | Mod: S$GLB,,, | Performed by: FAMILY MEDICINE

## 2023-03-16 PROCEDURE — 1159F MED LIST DOCD IN RCRD: CPT | Mod: CPTII,S$GLB,, | Performed by: FAMILY MEDICINE

## 2023-03-16 PROCEDURE — 1160F PR REVIEW ALL MEDS BY PRESCRIBER/CLIN PHARMACIST DOCUMENTED: ICD-10-PCS | Mod: CPTII,S$GLB,, | Performed by: FAMILY MEDICINE

## 2023-03-16 PROCEDURE — 1159F PR MEDICATION LIST DOCUMENTED IN MEDICAL RECORD: ICD-10-PCS | Mod: CPTII,S$GLB,, | Performed by: FAMILY MEDICINE

## 2023-03-16 PROCEDURE — 3044F HG A1C LEVEL LT 7.0%: CPT | Mod: CPTII,S$GLB,, | Performed by: FAMILY MEDICINE

## 2023-03-16 PROCEDURE — 1160F RVW MEDS BY RX/DR IN RCRD: CPT | Mod: CPTII,S$GLB,, | Performed by: FAMILY MEDICINE

## 2023-03-16 PROCEDURE — 99999 PR PBB SHADOW E&M-EST. PATIENT-LVL IV: ICD-10-PCS | Mod: PBBFAC,,, | Performed by: FAMILY MEDICINE

## 2023-03-16 PROCEDURE — 3008F PR BODY MASS INDEX (BMI) DOCUMENTED: ICD-10-PCS | Mod: CPTII,S$GLB,, | Performed by: FAMILY MEDICINE

## 2023-03-16 PROCEDURE — 3080F DIAST BP >= 90 MM HG: CPT | Mod: CPTII,S$GLB,, | Performed by: FAMILY MEDICINE

## 2023-03-16 RX ORDER — TAZAROTENE 0.05 MG/G
1 CREAM CUTANEOUS
COMMUNITY
Start: 2023-02-10

## 2023-03-16 RX ORDER — OXYMETAZOLINE HYDROCHLORIDE 1 G/100G
1 CREAM TOPICAL EVERY MORNING
COMMUNITY
Start: 2023-02-10

## 2023-03-16 NOTE — PROGRESS NOTES
Assessment & Plan  Annual physical exam    Elevated BP without diagnosis of hypertension  BP elevated in the office, which she attributes to stress. Patient will check her BP at home and sent updated readings over [email or web address]. Reviewed goal <130/80.    Early menopause occurring in patient age younger than 45 years  Labs and symptoms discussed with patient. She is most likely going through early menopause, possibly Mittelschmerz, or ovarian cysts that rupture and have not been visible on past US. Advised to discuss further with her OB/GYN.       Health maintenance reviewed.    Follow-up: Follow up in about 1 year (around 3/16/2024).  ______________________________________________________________________    Chief Complaint  Chief Complaint   Patient presents with    Annual Exam       HPI  Maria Alejandra Perez is a 42 y.o. female with medical diagnoses as listed in the medical history and problem list that presents to the office for an annual exam. She has been experiencing b/l LQ abdominal pain every 3 months on average, occasionally triggered by exercise, but this eventually resolves. She also has been experiencing weight gain, hot flashes, mood swings, bloating, and fatigue lately. She is s/p total hysterectomy but has both ovaries. She would like to know what is causing these symptoms rather than take a medication.     Health Maintenance         Date Due Completion Date    COVID-19 Vaccine (4 - Booster for Pfizer series) 2022    Mammogram 2023    Hemoglobin A1c (Diabetic Prevention Screening) 2026 3/11/2023    TETANUS VACCINE 2032              PAST MEDICAL HISTORY:  Past Medical History:   Diagnosis Date    Acne 10/1/2018    Resolved through oral and topical medications    Fibroids     Right arm fracture        PAST SURGICAL HISTORY:  Past Surgical History:   Procedure Laterality Date     SECTION      HYSTERECTOMY      DV, no BSO for fibroids. Dr. Metcalf     "LEFT THUMB SURGERY      right arm surgery prior fracture  1986       SOCIAL HISTORY:  Social History     Socioeconomic History    Marital status:    Tobacco Use    Smoking status: Former     Packs/day: 1.00     Years: 10.00     Pack years: 10.00     Types: Cigarettes    Smokeless tobacco: Never   Substance and Sexual Activity    Alcohol use: Yes     Alcohol/week: 0.0 standard drinks     Comment: social    Drug use: No    Sexual activity: Yes     Partners: Male     Birth control/protection: See Surgical Hx     Comment:         FAMILY HISTORY:  Family History   Problem Relation Age of Onset    Hypertension Father     Cancer Maternal Grandmother         esophageal cancer     Cancer Maternal Grandfather     Breast cancer Neg Hx     Colon cancer Neg Hx     Ovarian cancer Neg Hx        ALLERGIES AND MEDICATIONS: updated and reviewed.  Review of patient's allergies indicates:  No Known Allergies  Current Outpatient Medications   Medication Sig Dispense Refill    BIOTIN ORAL Take 5,000 Units by mouth Daily.      RHOFADE 1 % Crea Apply 1 application topically every morning.      TAZORAC 0.05 % Crea cream Apply 1 application topically.      tiZANidine (ZANAFLEX) 2 MG tablet Take 1-2 tabs PO QHS PRN muscle pain/spasms 60 tablet 2    diclofenac sodium (VOLTAREN) 1 % Gel Apply 2 g topically once daily. (Patient not taking: Reported on 10/28/2022) 100 g 1     No current facility-administered medications for this visit.         ROS  Review of Systems   Constitutional:  Positive for diaphoresis and unexpected weight change (gain).   HENT:  Negative for congestion and sore throat.    Eyes:  Negative for photophobia and visual disturbance.   Respiratory:  Negative for cough and shortness of breath.    Cardiovascular:  Negative for chest pain and leg swelling.   Gastrointestinal:  Positive for abdominal distention ("bloating") and abdominal pain. Negative for constipation, diarrhea, nausea and vomiting.   Endocrine: " "Negative for polydipsia, polyphagia and polyuria.   Genitourinary:  Negative for dysuria and urgency.   Musculoskeletal:  Negative for arthralgias and gait problem.   Skin:  Negative for color change.   Neurological:  Negative for dizziness, weakness and headaches.   Psychiatric/Behavioral:  Positive for agitation ("mood swings"). Negative for dysphoric mood and sleep disturbance. The patient is not nervous/anxious.          Physical Exam  Vitals:    03/16/23 0814 03/16/23 0838   BP: (!) 164/82 (!) 141/92   BP Location: Right arm    Patient Position: Sitting    BP Method: Medium (Manual)    Pulse: 77    Temp: 98.3 °F (36.8 °C)    TempSrc: Oral    SpO2: 99%    Weight: 82.9 kg (182 lb 10.4 oz)    Height: 5' 5" (1.651 m)     Body mass index is 30.39 kg/m².  Weight: 82.9 kg (182 lb 10.4 oz)   Height: 5' 5" (165.1 cm)   Physical Exam  Constitutional:       General: She is not in acute distress.     Appearance: She is obese.   HENT:      Head: Normocephalic and atraumatic.   Neck:      Thyroid: No thyromegaly.      Vascular: No carotid bruit.   Cardiovascular:      Rate and Rhythm: Normal rate and regular rhythm.      Pulses: Normal pulses.      Heart sounds: Normal heart sounds.   Pulmonary:      Effort: Pulmonary effort is normal. No respiratory distress.      Breath sounds: Normal breath sounds.   Musculoskeletal:      Cervical back: Neck supple.      Right lower leg: No edema.      Left lower leg: No edema.   Lymphadenopathy:      Cervical: No cervical adenopathy.   Skin:     General: Skin is warm and dry.      Findings: No rash.   Neurological:      General: No focal deficit present.      Mental Status: She is alert and oriented to person, place, and time.   Psychiatric:         Mood and Affect: Mood normal.         Behavior: Behavior normal.         Thought Content: Thought content normal.           "

## 2023-06-01 ENCOUNTER — PATIENT MESSAGE (OUTPATIENT)
Dept: FAMILY MEDICINE | Facility: CLINIC | Age: 43
End: 2023-06-01
Payer: COMMERCIAL

## 2023-06-01 VITALS — DIASTOLIC BLOOD PRESSURE: 65 MMHG | SYSTOLIC BLOOD PRESSURE: 120 MMHG

## 2023-06-02 ENCOUNTER — PATIENT MESSAGE (OUTPATIENT)
Dept: FAMILY MEDICINE | Facility: CLINIC | Age: 43
End: 2023-06-02
Payer: COMMERCIAL

## 2023-06-05 ENCOUNTER — PATIENT MESSAGE (OUTPATIENT)
Dept: OBSTETRICS AND GYNECOLOGY | Facility: CLINIC | Age: 43
End: 2023-06-05

## 2023-06-05 ENCOUNTER — PATIENT MESSAGE (OUTPATIENT)
Dept: OBSTETRICS AND GYNECOLOGY | Facility: CLINIC | Age: 43
End: 2023-06-05
Payer: COMMERCIAL

## 2023-06-05 ENCOUNTER — OFFICE VISIT (OUTPATIENT)
Dept: OBSTETRICS AND GYNECOLOGY | Facility: CLINIC | Age: 43
End: 2023-06-05
Payer: COMMERCIAL

## 2023-06-05 DIAGNOSIS — E34.9 HORMONE IMBALANCE: Primary | ICD-10-CM

## 2023-06-05 PROCEDURE — 3044F PR MOST RECENT HEMOGLOBIN A1C LEVEL <7.0%: ICD-10-PCS | Mod: CPTII,95,, | Performed by: OBSTETRICS & GYNECOLOGY

## 2023-06-05 PROCEDURE — 99213 OFFICE O/P EST LOW 20 MIN: CPT | Mod: 95,,, | Performed by: OBSTETRICS & GYNECOLOGY

## 2023-06-05 PROCEDURE — 3044F HG A1C LEVEL LT 7.0%: CPT | Mod: CPTII,95,, | Performed by: OBSTETRICS & GYNECOLOGY

## 2023-06-05 PROCEDURE — 99213 PR OFFICE/OUTPT VISIT, EST, LEVL III, 20-29 MIN: ICD-10-PCS | Mod: 95,,, | Performed by: OBSTETRICS & GYNECOLOGY

## 2023-06-05 NOTE — PROGRESS NOTES
The patient location is: [place]  The chief complaint leading to consultation is: hormones     Visit type: audio only, could not get microphone to work    Face to Face time with patient: 10 min  15 minutes of total time spent on the encounter, which includes face to face time and non-face to face time preparing to see the patient (eg, review of tests), Obtaining and/or reviewing separately obtained history, Documenting clinical information in the electronic or other health record, Independently interpreting results (not separately reported) and communicating results to the patient/family/caregiver, or Care coordination (not separately reported).         Each patient to whom he or she provides medical services by telemedicine is:  (1) informed of the relationship between the physician and patient and the respective role of any other health care provider with respect to management of the patient; and (2) notified that he or she may decline to receive medical services by telemedicine and may withdraw from such care at any time.    Notes: Here to discuss her hormones. Had labs with PCP. FSH 25 and Estradiol 49.     See portal msg from today for her description of her symptoms:    Hi Dr. Metcalf! I have been experiencing multiple symptoms of menopause for a while now. I had a hysterectomy (leaving ovaries intact) in 2010.      I had bloodwork done in March with my primary care doctor. My FSH levels indicated I may be entering early menopause...but my doctor doesn't seem to be able to help me manage my symptoms, and she keeps saying menopause is diagnosed clinically. I understand that, but I am trying to optimize my health and quality of life, and I need help. I am having horrible mood swings, weight gain (even though I track all food and exercise daily), no sex drive, vaginally dryness, hot flashes, bloating...     Can you help with this? I want to balance my hormones and figure out what's going on with my body. Thank  you    We discussed her labs and how she is feeling. Perimenopause, especially with previous hysterectomy. She mainly has a hard time without having periods knowing what is going on with her body. Discussed. Offered Prometrium. For now she is ok just to monitor. All questions answered.   Recommend book on libido and Magnesium supplements.

## 2023-09-11 ENCOUNTER — PATIENT MESSAGE (OUTPATIENT)
Dept: FAMILY MEDICINE | Facility: CLINIC | Age: 43
End: 2023-09-11
Payer: COMMERCIAL

## 2023-09-11 ENCOUNTER — PATIENT MESSAGE (OUTPATIENT)
Dept: ADMINISTRATIVE | Facility: HOSPITAL | Age: 43
End: 2023-09-11
Payer: COMMERCIAL

## 2023-09-11 DIAGNOSIS — Z12.31 BREAST CANCER SCREENING BY MAMMOGRAM: Primary | ICD-10-CM

## 2023-10-06 ENCOUNTER — PATIENT MESSAGE (OUTPATIENT)
Dept: OBSTETRICS AND GYNECOLOGY | Facility: CLINIC | Age: 43
End: 2023-10-06
Payer: COMMERCIAL

## 2023-11-22 ENCOUNTER — E-VISIT (OUTPATIENT)
Dept: FAMILY MEDICINE | Facility: CLINIC | Age: 43
End: 2023-11-22
Payer: COMMERCIAL

## 2023-11-22 DIAGNOSIS — H10.9 CONJUNCTIVITIS, UNSPECIFIED CONJUNCTIVITIS TYPE, UNSPECIFIED LATERALITY: Primary | ICD-10-CM

## 2023-11-22 PROCEDURE — 99422 OL DIG E/M SVC 11-20 MIN: CPT | Mod: ,,, | Performed by: STUDENT IN AN ORGANIZED HEALTH CARE EDUCATION/TRAINING PROGRAM

## 2023-11-22 PROCEDURE — 99422 PR E&M, ONLINE DIGIT, EST, < 7 DAYS,  11-20 MINS: ICD-10-PCS | Mod: ,,, | Performed by: STUDENT IN AN ORGANIZED HEALTH CARE EDUCATION/TRAINING PROGRAM

## 2023-11-22 RX ORDER — CIPROFLOXACIN HYDROCHLORIDE 3 MG/ML
SOLUTION/ DROPS OPHTHALMIC
Qty: 10 ML | Refills: 0 | Status: SHIPPED | OUTPATIENT
Start: 2023-11-22

## 2023-11-22 NOTE — PROGRESS NOTES
Patient ID: Maria Alejandra Perez is a 43 y.o. female.    Chief Complaint: Conjunctivitis (Entered automatically based on patient selection in Patient Portal.)          274}  The patient initiated a request through [employer] on 11/22/2023 for evaluation and management with a chief complaint of Conjunctivitis (Entered automatically based on patient selection in Patient Portal.)     I evaluated the questionnaire submission on 11/22/2023 .    Ohs Peq Evisit Red Eye    11/22/2023  1:05 PM CST - Filed by Patient   Do you agree to participate in an E-Visit? Yes   If you have any of the following symptoms, please present to your local ER or call 911:  I acknowledge   What is the main issue that you would like for your doctor to address today? I woke up with a bloodshot eye that is slowly getting worse as the day goes on.   Are you able to take your vital signs? Yes   Systolic Blood Pressure: 119   Diastolic Blood Pressure: 65   Weight: 166   Height: 65   Pulse: 68   Temperature: 97.2   Respiration rate:    Pulse Oxygen:    Are you currently pregnant, could you be pregnant, or are you breast feeding? None of the above   Which of the following have you been experiencing? One eye is red   Have you had any of the following? None of the above    How long have you been having these symptoms? Just today   Do you have a fever? No, I do not have a fever   Are your symptoms associated with swimming? No, I have not been swimming recently   Have your eyes been exposed to any chemicals, creams, or drops that may be causing irritation? I am not sure   Have you suffered any recent injury to your eyes? No   Have you been exposed to anyone with similar symptoms? No   Did or do you wear contact lenses? Yes   Have you had issues with removing your contact lenses? No   Have you had any of the following? None of the above   Have you had any of the following in the past? I have not had any past problems with my eyes.   What medications are you  currently using for these symptoms? Eye drops from the shelf in the pharmacy   Please enter the medications you have been using: Visine Redness Reliever   Provide any information you feel is important to your history not asked above    At least one photo is required for treatment to be provided. You can upload a maximum of three photos of the affected area.            Active Problem List with Overview Notes    Diagnosis Date Noted    Left knee pain 02/04/2020    Synovial plica syndrome of left knee 02/04/2020    Chronic adenitis 01/13/2020    Tonsillith 01/13/2020    Hypertrophy of tonsil 01/13/2020    Nasal septal deviation 01/13/2020    Allergic rhinitis 01/13/2020    H/O: hysterectomy 07/28/2016     Fibroids. DVH, no BSO        Recent Labs Obtained:  Lab Results   Component Value Date    WBC 5.59 03/11/2023    HGB 14.0 03/11/2023    HCT 42.0 03/11/2023    MCV 96 03/11/2023     03/11/2023     03/11/2023    K 4.8 03/11/2023    GLU 90 03/11/2023    CREATININE 0.7 03/11/2023    EGFRNORACEVR >60 03/11/2023    HGBA1C 4.7 03/11/2023      Review of patient's allergies indicates:  No Known Allergies    Encounter Diagnosis   Name Primary?    Conjunctivitis, unspecified conjunctivitis type, unspecified laterality Yes        No orders of the defined types were placed in this encounter.     Medications Ordered This Encounter   Medications    ciprofloxacin HCl (CILOXAN) 0.3 % ophthalmic solution     Sig: Use 1 to 2 drops to the affected eye every 2 hours while awake for 2 days,then 1 to 2 drops every 4 hours while awake for the next 5 days     Dispense:  10 mL     Refill:  0        E-Visit Time Tracking:    Day 1 Time (in minutes): 12     Total Time (in minutes): 12       274}

## 2023-11-27 ENCOUNTER — OFFICE VISIT (OUTPATIENT)
Dept: OBSTETRICS AND GYNECOLOGY | Facility: CLINIC | Age: 43
End: 2023-11-27
Payer: COMMERCIAL

## 2023-11-27 VITALS
DIASTOLIC BLOOD PRESSURE: 80 MMHG | BODY MASS INDEX: 28.4 KG/M2 | SYSTOLIC BLOOD PRESSURE: 150 MMHG | HEIGHT: 65 IN | WEIGHT: 170.44 LBS

## 2023-11-27 DIAGNOSIS — Z01.419 ENCOUNTER FOR GYNECOLOGICAL EXAMINATION (GENERAL) (ROUTINE) WITHOUT ABNORMAL FINDINGS: Primary | ICD-10-CM

## 2023-11-27 PROCEDURE — 3044F HG A1C LEVEL LT 7.0%: CPT | Mod: CPTII,S$GLB,, | Performed by: OBSTETRICS & GYNECOLOGY

## 2023-11-27 PROCEDURE — 3044F PR MOST RECENT HEMOGLOBIN A1C LEVEL <7.0%: ICD-10-PCS | Mod: CPTII,S$GLB,, | Performed by: OBSTETRICS & GYNECOLOGY

## 2023-11-27 PROCEDURE — 99999 PR PBB SHADOW E&M-EST. PATIENT-LVL III: CPT | Mod: PBBFAC,,, | Performed by: OBSTETRICS & GYNECOLOGY

## 2023-11-27 PROCEDURE — 3077F SYST BP >= 140 MM HG: CPT | Mod: CPTII,S$GLB,, | Performed by: OBSTETRICS & GYNECOLOGY

## 2023-11-27 PROCEDURE — 3077F PR MOST RECENT SYSTOLIC BLOOD PRESSURE >= 140 MM HG: ICD-10-PCS | Mod: CPTII,S$GLB,, | Performed by: OBSTETRICS & GYNECOLOGY

## 2023-11-27 PROCEDURE — 99999 PR PBB SHADOW E&M-EST. PATIENT-LVL III: ICD-10-PCS | Mod: PBBFAC,,, | Performed by: OBSTETRICS & GYNECOLOGY

## 2023-11-27 PROCEDURE — 1159F MED LIST DOCD IN RCRD: CPT | Mod: CPTII,S$GLB,, | Performed by: OBSTETRICS & GYNECOLOGY

## 2023-11-27 PROCEDURE — 99396 PREV VISIT EST AGE 40-64: CPT | Mod: S$GLB,,, | Performed by: OBSTETRICS & GYNECOLOGY

## 2023-11-27 PROCEDURE — 3079F PR MOST RECENT DIASTOLIC BLOOD PRESSURE 80-89 MM HG: ICD-10-PCS | Mod: CPTII,S$GLB,, | Performed by: OBSTETRICS & GYNECOLOGY

## 2023-11-27 PROCEDURE — 3008F BODY MASS INDEX DOCD: CPT | Mod: CPTII,S$GLB,, | Performed by: OBSTETRICS & GYNECOLOGY

## 2023-11-27 PROCEDURE — 1159F PR MEDICATION LIST DOCUMENTED IN MEDICAL RECORD: ICD-10-PCS | Mod: CPTII,S$GLB,, | Performed by: OBSTETRICS & GYNECOLOGY

## 2023-11-27 PROCEDURE — 99396 PR PREVENTIVE VISIT,EST,40-64: ICD-10-PCS | Mod: S$GLB,,, | Performed by: OBSTETRICS & GYNECOLOGY

## 2023-11-27 PROCEDURE — 3079F DIAST BP 80-89 MM HG: CPT | Mod: CPTII,S$GLB,, | Performed by: OBSTETRICS & GYNECOLOGY

## 2023-11-27 PROCEDURE — 3008F PR BODY MASS INDEX (BMI) DOCUMENTED: ICD-10-PCS | Mod: CPTII,S$GLB,, | Performed by: OBSTETRICS & GYNECOLOGY

## 2023-11-27 RX ORDER — BRIMONIDINE TARTRATE 5 MG/G
GEL TOPICAL
COMMUNITY
Start: 2023-11-15

## 2023-11-27 RX ORDER — CRANBERRY FRUIT EXTRACT 650 MG
CAPSULE ORAL
COMMUNITY
Start: 2023-07-27

## 2023-11-27 NOTE — PROGRESS NOTES
Subjective:       Patient ID: Maria Alejandra Perez is a 43 y.o. female.    Chief Complaint:  Annual Exam (H/o hysterectomy at age 30. -still has both ovaries. /Last mammogram: 2022 birads: 2 -benign/Mammogram (this year's): . /She started Prometrium & DHEA in July this year. /Comprehensive labs ran, brought in records. /)        History of Present Illness  Maria Alejandra Perez is a 43 y.o. female  who presents for annual. Saw functional medicine MD online and was low in progesterone and DHEA. Her main issue is weight gain despite following strict diet and doing all the right things. Since she has been taking the supplements she does feel better. Hard to do the labs because they normally want it at a certain time of her cycle but since she had a partial hyst it is hard to say. Discussed in detail. Overall she is feeling better. Still making good estrogen despite elevated FSH. Discussed HRT long term. All questions answered. Face to face time 20 minutes    No LMP recorded. Patient has had a hysterectomy.   Date of Last Pap: No result found    Review of Systems  Review of Systems   Constitutional:  Negative for chills and fever.        Objective:   Physical Exam:   Constitutional: She is oriented to person, place, and time. Vital signs are normal. She appears well-developed and well-nourished. No distress.        Pulmonary/Chest: She exhibits no mass. Right breast exhibits no mass, no nipple discharge, no skin change, no tenderness, no bleeding and no swelling. Left breast exhibits no mass, no nipple discharge, no skin change, no tenderness, no bleeding and no swelling. Breasts are symmetrical.        Abdominal: Soft. Bowel sounds are normal. She exhibits no distension and no mass. There is no abdominal tenderness. There is no rebound.     Genitourinary:    Vagina normal.   There is no rash, tenderness, lesion or injury on the right labia. There is no rash, tenderness, lesion or injury on the left labia.  Right adnexum displays no mass, no tenderness and no fullness. Left adnexum displays no mass, no tenderness and no fullness. No erythema,  no vaginal discharge, tenderness, rectocele, cystocele or unspecified prolapse of vaginal walls in the vagina. Cervix is absent.Uterus is absent.           Musculoskeletal: Normal range of motion and moves all extremeties.      Lymphadenopathy:        Right: No supraclavicular adenopathy present.        Left: No supraclavicular adenopathy present.    Neurological: She is alert and oriented to person, place, and time.    Skin: Skin is warm and dry.    Psychiatric: She has a normal mood and affect. Her behavior is normal. Judgment normal.        Assessment/ Plan:     1. Encounter for gynecological examination (general) (routine) without abnormal findings            No follow-ups on file.    As of April 1, 2021, the Cures Act has been passed nationally. This new law requires that all doctors progress notes, lab results, pathology reports and radiology reports be released IMMEDIATELY to the patient in the patient portal. That means that the results are released to you at the EXACT same time they are released to me. Therefore, with all of the patients that I have I am not able to reply to each patient exactly when the results come in. So there will be a delay from when you see the results to when I see them and have time to come up with a response to send you. Also I only see these results when I am on the computer at work. So if the results come in over the weekend or after 5 pm of a work day, I will not see them until the next business day. As you can tell, this is a challenge as a physician to give every patient the quick response they hope for and deserve. So please be patient! Thanks for understanding, Dr. Metcalf

## 2023-11-29 ENCOUNTER — HOSPITAL ENCOUNTER (OUTPATIENT)
Dept: RADIOLOGY | Facility: OTHER | Age: 43
Discharge: HOME | End: 2023-11-29
Attending: FAMILY MEDICINE
Payer: COMMERCIAL

## 2023-11-29 DIAGNOSIS — Z12.31 BREAST CANCER SCREENING BY MAMMOGRAM: ICD-10-CM

## 2023-11-29 PROCEDURE — 77067 MAMMO DIGITAL SCREENING BILAT WITH TOMO: ICD-10-PCS | Mod: 26,,, | Performed by: RADIOLOGY

## 2023-11-29 PROCEDURE — 77067 SCR MAMMO BI INCL CAD: CPT | Mod: TC

## 2023-11-29 PROCEDURE — 77067 SCR MAMMO BI INCL CAD: CPT | Mod: 26,,, | Performed by: RADIOLOGY

## 2023-11-29 PROCEDURE — 77063 MAMMO DIGITAL SCREENING BILAT WITH TOMO: ICD-10-PCS | Mod: 26,,, | Performed by: RADIOLOGY

## 2023-11-29 PROCEDURE — 77063 BREAST TOMOSYNTHESIS BI: CPT | Mod: 26,,, | Performed by: RADIOLOGY

## 2024-02-19 ENCOUNTER — PATIENT MESSAGE (OUTPATIENT)
Dept: FAMILY MEDICINE | Facility: CLINIC | Age: 44
End: 2024-02-19
Payer: COMMERCIAL

## 2024-02-19 ENCOUNTER — TELEPHONE (OUTPATIENT)
Dept: FAMILY MEDICINE | Facility: CLINIC | Age: 44
End: 2024-02-19
Payer: COMMERCIAL

## 2024-02-19 DIAGNOSIS — F41.8 SITUATIONAL ANXIETY: Primary | ICD-10-CM

## 2024-02-19 RX ORDER — HYDROXYZINE PAMOATE 25 MG/1
25 CAPSULE ORAL DAILY PRN
Qty: 30 CAPSULE | Refills: 1 | Status: SHIPPED | OUTPATIENT
Start: 2024-02-19 | End: 2024-06-10

## 2024-02-19 NOTE — TELEPHONE ENCOUNTER
----- Message from Rosa Stephenson sent at 2/19/2024  6:45 AM CST -----  Pt would like a call back flying out  today and would like something to help her relax    Can be reached  [phone]

## 2024-02-19 NOTE — TELEPHONE ENCOUNTER
Returned call to pt, pt states she will be flying today at 2:30 and is requesting something for anxiety.

## 2024-06-10 ENCOUNTER — OFFICE VISIT (OUTPATIENT)
Dept: FAMILY MEDICINE | Facility: CLINIC | Age: 44
End: 2024-06-10
Payer: COMMERCIAL

## 2024-06-10 ENCOUNTER — PATIENT MESSAGE (OUTPATIENT)
Dept: FAMILY MEDICINE | Facility: CLINIC | Age: 44
End: 2024-06-10

## 2024-06-10 ENCOUNTER — TELEPHONE (OUTPATIENT)
Dept: FAMILY MEDICINE | Facility: CLINIC | Age: 44
End: 2024-06-10

## 2024-06-10 VITALS
OXYGEN SATURATION: 99 % | WEIGHT: 175.25 LBS | DIASTOLIC BLOOD PRESSURE: 70 MMHG | HEART RATE: 85 BPM | BODY MASS INDEX: 29.2 KG/M2 | HEIGHT: 65 IN | SYSTOLIC BLOOD PRESSURE: 140 MMHG

## 2024-06-10 DIAGNOSIS — N95.1 MENOPAUSAL SYMPTOMS: ICD-10-CM

## 2024-06-10 DIAGNOSIS — Z90.710 H/O: HYSTERECTOMY: ICD-10-CM

## 2024-06-10 DIAGNOSIS — Z76.89 ENCOUNTER TO ESTABLISH CARE: Primary | ICD-10-CM

## 2024-06-10 PROCEDURE — 1160F RVW MEDS BY RX/DR IN RCRD: CPT | Mod: CPTII,S$GLB,, | Performed by: FAMILY MEDICINE

## 2024-06-10 PROCEDURE — 99214 OFFICE O/P EST MOD 30 MIN: CPT | Mod: S$GLB,,, | Performed by: FAMILY MEDICINE

## 2024-06-10 PROCEDURE — 1159F MED LIST DOCD IN RCRD: CPT | Mod: CPTII,S$GLB,, | Performed by: FAMILY MEDICINE

## 2024-06-10 PROCEDURE — 3077F SYST BP >= 140 MM HG: CPT | Mod: CPTII,S$GLB,, | Performed by: FAMILY MEDICINE

## 2024-06-10 PROCEDURE — 3078F DIAST BP <80 MM HG: CPT | Mod: CPTII,S$GLB,, | Performed by: FAMILY MEDICINE

## 2024-06-10 PROCEDURE — 99999 PR PBB SHADOW E&M-EST. PATIENT-LVL IV: CPT | Mod: PBBFAC,,, | Performed by: FAMILY MEDICINE

## 2024-06-10 PROCEDURE — 3008F BODY MASS INDEX DOCD: CPT | Mod: CPTII,S$GLB,, | Performed by: FAMILY MEDICINE

## 2024-06-10 RX ORDER — FLUOXETINE 10 MG/1
10 CAPSULE ORAL DAILY
Qty: 30 CAPSULE | Refills: 11 | Status: SHIPPED | OUTPATIENT
Start: 2024-06-10 | End: 2025-06-10

## 2024-06-10 NOTE — PROGRESS NOTES
Assessment & Plan  Problem List Items Addressed This Visit          Renal/    H/O: hysterectomy    Overview     Fibroids. DVH, no BSO          Other Visit Diagnoses       Encounter to establish care    -  Primary    Menopausal symptoms        Relevant Medications    FLUoxetine 10 MG capsule           Assessment & Plan  1. Perimenopausal symptoms.  The patient's blood work does not definitively indicate a perimenopausal state, however, her symptoms suggest otherwise. The severity of these symptoms warrants pharmacological intervention. We have discussed the potential benefits and risks associated with hormone replacement therapy (HRT), antidepressants, and antianxiety medications. It is noteworthy that while HRT may alleviate her symptoms, it could also contribute to weight gain. The patient has expressed a preference for fluoxetine, which may also provide relief from her anxiety. The patient will commence treatment with fluoxetine, while continuing her current progesterone regimen. She has been advised to seek counseling for her anxiety.    Follow-up  The patient is scheduled for a follow-up visit in 4 to 6 weeks to evaluate the effectiveness of the treatment.    Results        Health Maintenance reviewed.      ______________________________________________________________________    Chief Complaint  Chief Complaint   Patient presents with    [facility]       HPI  Maria Alejandra Brian Perez is a 44 y.o. female with multiple medical diagnoses as listed in the medical history and problem list that presents for [facility].  Pt is new to me.   History of Present Illness  The patient is a 44-year-old female who presents to [facility] with a new primary care physician.    The patient underwent a hysterectomy in 2010 due to fibroids, resulting in the cessation of her menstrual cycle. She has been experiencing weight gain and severe hot flashes, predominantly nocturnal, which she believes may be related to anxiety.  These symptoms have been particularly distressing over the past few months. She reports that anxious thoughts often precede the onset of hot flashes, accompanied by racing thoughts. Her sleep pattern is disrupted, with frequent awakenings every hour after an initial sleep period of 2 to 3 hours. Upon awakening, she experiences hot flashes and anxious thoughts. She has a history of intermittent hot flashes since the age of 36, but they have become more persistent this year. She is under the care of an OB/GYN who has suggested that she may be perimenopausal. She is currently on progesterone, prescribed by a functional medicine doctor, as her OB/GYN did not recommend birth control. She was informed that her estrogen levels are within normal range, but she may have had elevated estrogen levels throughout her life. She has a history of prolonged menstrual bleeding at the age of 13, lasting for 2 months, which was attributed to cysts and managed with birth control. She has found relief from severe cramping around ovulation and water retention with progesterone. Despite regular exercise and dietary control, she has gained 20 pounds over the past year. She denies any family history of blood clots. She also reports marital stress due to her 's alcoholism.   She is not a smoker anymore.           PAST MEDICAL HISTORY:  Past Medical History:   Diagnosis Date    Acne 10/1/2018    Resolved through oral and topical medications    Fibroids     Right arm fracture        PAST SURGICAL HISTORY:  Past Surgical History:   Procedure Laterality Date     SECTION      HYSTERECTOMY      [facility], no BSO for fibroids. Dr. Metcalf    LEFT THUMB SURGERY      right arm surgery prior fracture         SOCIAL HISTORY:  Social History     Socioeconomic History    Marital status:    Tobacco Use    Smoking status: Former     Current packs/day: 1.00     Average packs/day: 1 pack/day for 10.0 years (10.0 ttl pk-yrs)     Types:  Cigarettes    Smokeless tobacco: Never   Substance and Sexual Activity    Alcohol use: Yes     Alcohol/week: 0.0 standard drinks of alcohol     Comment: social    Drug use: No    Sexual activity: Yes     Partners: Male     Birth control/protection: See Surgical Hx     Comment:       Social Determinants of Health     Financial Resource Strain: Low Risk  (6/10/2024)    Overall Financial Resource Strain (CARDIA)     Difficulty of Paying Living Expenses: Not hard at all   Food Insecurity: No Food Insecurity (6/10/2024)    Hunger Vital Sign     Worried About Running Out of Food in the Last Year: Never true     Ran Out of Food in the Last Year: Never true   Physical Activity: Sufficiently Active (6/10/2024)    Exercise Vital Sign     Days of Exercise per Week: 7 days     Minutes of Exercise per Session: 50 min   Stress: Stress Concern Present (6/10/2024)    [employer] [place] [employer] - Occupational Stress Questionnaire     Feeling of Stress : Very much   Housing Stability: Unknown (6/10/2024)    Housing Stability Vital Sign     Unable to Pay for Housing in the Last Year: No       FAMILY HISTORY:  Family History   Problem Relation Name Age of Onset    Hypertension Father Sanya Torres     Cancer Maternal Grandmother Katarina Olivia         esophageal cancer     Cancer Maternal Grandfather      Breast cancer Neg Hx      Colon cancer Neg Hx      Ovarian cancer Neg Hx         ALLERGIES AND MEDICATIONS: updated and reviewed.  Review of patient's allergies indicates:  No Known Allergies  Current Outpatient Medications   Medication Sig Dispense Refill    BIOTIN ORAL Take 5,000 Units by mouth Daily.      prasterone, dhea, (DHEA) 25 mg Cap       PROGESTERONE, BULK, MISC before dinner.      FLUoxetine 10 MG capsule Take 1 capsule (10 mg total) by mouth once daily. 30 capsule 11    MIRVASO 0.33 % GlwP APPLY TO FACE ONCE DAILY (Patient not taking: Reported on 6/10/2024)       No current facility-administered  "medications for this visit.         ROS  Review of Systems   Constitutional:  Negative for activity change, appetite change, fatigue, fever and unexpected weight change.   HENT: Negative.  Negative for ear discharge, ear pain, rhinorrhea and sore throat.    Eyes: Negative.    Respiratory:  Negative for apnea, cough, chest tightness, shortness of breath and wheezing.    Cardiovascular:  Negative for chest pain, palpitations and leg swelling.   Gastrointestinal:  Negative for abdominal distention, abdominal pain, constipation, diarrhea and vomiting.   Endocrine: Negative for cold intolerance, heat intolerance, polydipsia and polyuria.   Genitourinary:  Negative for decreased urine volume and urgency.   Musculoskeletal: Negative.    Skin:  Negative for rash.   Neurological:  Negative for dizziness and headaches.   Hematological:  Does not bruise/bleed easily.   Psychiatric/Behavioral:  Negative for agitation, sleep disturbance and suicidal ideas. The patient is nervous/anxious.            Physical Exam  Vitals:    06/10/24 1310   BP: (!) 140/70   Pulse: 85   SpO2: 99%   Weight: 79.5 kg (175 lb 4.3 oz)   Height: 5' 5" (1.651 m)    Body mass index is 29.17 kg/m².  Weight: 79.5 kg (175 lb 4.3 oz)   Height: 5' 5" (165.1 cm)   Physical Exam  Vitals reviewed.   Constitutional:       Appearance: Normal appearance. She is well-developed.   HENT:      Head: Normocephalic and atraumatic.      Right Ear: External ear normal.      Left Ear: External ear normal.      Nose: Nose normal.      Mouth/Throat:      Mouth: Mucous membranes are moist.      Pharynx: Oropharynx is clear.   Eyes:      Extraocular Movements: Extraocular movements intact.      Conjunctiva/sclera: Conjunctivae normal.      Pupils: Pupils are equal, round, and reactive to light.   Cardiovascular:      Rate and Rhythm: Normal rate and regular rhythm.      Heart sounds: Normal heart sounds.   Pulmonary:      Effort: Pulmonary effort is normal.      Breath sounds: " Normal breath sounds.   Skin:     General: Skin is warm and dry.   Neurological:      Mental Status: She is alert and oriented to person, place, and time.        Physical Exam  The lungs are clear.  The heart sounds good.         Health Maintenance         Date Due Completion Date    COVID-19 Vaccine (4 - 2023-24 season) 09/01/2023 2/12/2022    Influenza Vaccine (Season Ended) 09/01/2024 10/4/2022    Mammogram 11/29/2024 11/29/2023    Hemoglobin A1c (Diabetic Prevention Screening) 03/11/2026 3/11/2023    TETANUS VACCINE 01/24/2032 1/24/2022                Patient note was created using [employer].  Any errors in syntax or even information may not have been identified and edited on initial review prior to signing this note.

## 2024-06-10 NOTE — TELEPHONE ENCOUNTER
Sent self scheduling link to [facility]. Left message on voice mail to return call to clinic.

## 2024-06-12 ENCOUNTER — LAB VISIT (OUTPATIENT)
Dept: LAB | Facility: HOSPITAL | Age: 44
End: 2024-06-12
Payer: COMMERCIAL

## 2024-06-12 DIAGNOSIS — Z76.89 ENCOUNTER TO ESTABLISH CARE: ICD-10-CM

## 2024-06-12 LAB
ALBUMIN SERPL BCP-MCNC: 4.5 G/DL (ref 3.5–5.2)
ALP SERPL-CCNC: 85 U/L (ref 55–135)
ALT SERPL W/O P-5'-P-CCNC: 25 U/L (ref 10–44)
ANION GAP SERPL CALC-SCNC: 10 MMOL/L (ref 8–16)
AST SERPL-CCNC: 26 U/L (ref 10–40)
BASOPHILS # BLD AUTO: 0.04 K/UL (ref 0–0.2)
BASOPHILS NFR BLD: 0.7 % (ref 0–1.9)
BILIRUB SERPL-MCNC: 0.6 MG/DL (ref 0.1–1)
BUN SERPL-MCNC: 18 MG/DL (ref 6–20)
CALCIUM SERPL-MCNC: 9.9 MG/DL (ref 8.7–10.5)
CHLORIDE SERPL-SCNC: 104 MMOL/L (ref 95–110)
CHOLEST SERPL-MCNC: 179 MG/DL (ref 120–199)
CHOLEST/HDLC SERPL: 3 {RATIO} (ref 2–5)
CO2 SERPL-SCNC: 24 MMOL/L (ref 23–29)
CREAT SERPL-MCNC: 0.8 MG/DL (ref 0.5–1.4)
DIFFERENTIAL METHOD BLD: ABNORMAL
EOSINOPHIL # BLD AUTO: 0.2 K/UL (ref 0–0.5)
EOSINOPHIL NFR BLD: 2.8 % (ref 0–8)
ERYTHROCYTE [DISTWIDTH] IN BLOOD BY AUTOMATED COUNT: 12.1 % (ref 11.5–14.5)
EST. GFR  (NO RACE VARIABLE): >60 ML/MIN/1.73 M^2
ESTIMATED AVG GLUCOSE: 94 MG/DL (ref 68–131)
GLUCOSE SERPL-MCNC: 79 MG/DL (ref 70–110)
HBA1C MFR BLD: 4.9 % (ref 4–5.6)
HCT VFR BLD AUTO: 44.5 % (ref 37–48.5)
HDLC SERPL-MCNC: 59 MG/DL (ref 40–75)
HDLC SERPL: 33 % (ref 20–50)
HGB BLD-MCNC: 15 G/DL (ref 12–16)
IMM GRANULOCYTES # BLD AUTO: 0.03 K/UL (ref 0–0.04)
IMM GRANULOCYTES NFR BLD AUTO: 0.5 % (ref 0–0.5)
LDLC SERPL CALC-MCNC: 103.6 MG/DL (ref 63–159)
LYMPHOCYTES # BLD AUTO: 2.7 K/UL (ref 1–4.8)
LYMPHOCYTES NFR BLD: 44 % (ref 18–48)
MCH RBC QN AUTO: 32.3 PG (ref 27–31)
MCHC RBC AUTO-ENTMCNC: 33.7 G/DL (ref 32–36)
MCV RBC AUTO: 96 FL (ref 82–98)
MONOCYTES # BLD AUTO: 0.5 K/UL (ref 0.3–1)
MONOCYTES NFR BLD: 8.4 % (ref 4–15)
NEUTROPHILS # BLD AUTO: 2.6 K/UL (ref 1.8–7.7)
NEUTROPHILS NFR BLD: 43.6 % (ref 38–73)
NONHDLC SERPL-MCNC: 120 MG/DL
NRBC BLD-RTO: 0 /100 WBC
PLATELET # BLD AUTO: 210 K/UL (ref 150–450)
PMV BLD AUTO: 10.8 FL (ref 9.2–12.9)
POTASSIUM SERPL-SCNC: 4.7 MMOL/L (ref 3.5–5.1)
PROT SERPL-MCNC: 7.3 G/DL (ref 6–8.4)
RBC # BLD AUTO: 4.65 M/UL (ref 4–5.4)
SODIUM SERPL-SCNC: 138 MMOL/L (ref 136–145)
TRIGL SERPL-MCNC: 82 MG/DL (ref 30–150)
TSH SERPL DL<=0.005 MIU/L-ACNC: 1.14 UIU/ML (ref 0.4–4)
WBC # BLD AUTO: 6.04 K/UL (ref 3.9–12.7)

## 2024-06-12 PROCEDURE — 83036 HEMOGLOBIN GLYCOSYLATED A1C: CPT | Performed by: FAMILY MEDICINE

## 2024-06-12 PROCEDURE — 80061 LIPID PANEL: CPT | Performed by: FAMILY MEDICINE

## 2024-06-12 PROCEDURE — 80053 COMPREHEN METABOLIC PANEL: CPT | Performed by: FAMILY MEDICINE

## 2024-06-12 PROCEDURE — 84443 ASSAY THYROID STIM HORMONE: CPT | Performed by: FAMILY MEDICINE

## 2024-06-12 PROCEDURE — 85025 COMPLETE CBC W/AUTO DIFF WBC: CPT | Performed by: FAMILY MEDICINE

## 2024-06-12 PROCEDURE — 36415 COLL VENOUS BLD VENIPUNCTURE: CPT | Mod: PO | Performed by: FAMILY MEDICINE

## 2024-06-17 ENCOUNTER — LAB VISIT (OUTPATIENT)
Dept: LAB | Facility: HOSPITAL | Age: 44
End: 2024-06-17
Payer: COMMERCIAL

## 2024-06-17 DIAGNOSIS — Z90.710 H/O: HYSTERECTOMY: ICD-10-CM

## 2024-06-17 DIAGNOSIS — Z76.89 ENCOUNTER TO ESTABLISH CARE: ICD-10-CM

## 2024-06-17 DIAGNOSIS — N95.1 MENOPAUSAL SYMPTOMS: ICD-10-CM

## 2024-06-17 LAB
ESTRADIOL SERPL-MCNC: <10 PG/ML
FSH SERPL-ACNC: 66.54 MIU/ML
LH SERPL-ACNC: 28.2 MIU/ML
PROGEST SERPL-MCNC: 2.2 NG/ML
PROLACTIN SERPL IA-MCNC: 37.8 NG/ML (ref 5.2–26.5)

## 2024-06-17 PROCEDURE — 36415 COLL VENOUS BLD VENIPUNCTURE: CPT | Mod: PO | Performed by: FAMILY MEDICINE

## 2024-06-17 PROCEDURE — 84144 ASSAY OF PROGESTERONE: CPT | Performed by: FAMILY MEDICINE

## 2024-06-17 PROCEDURE — 83002 ASSAY OF GONADOTROPIN (LH): CPT | Performed by: FAMILY MEDICINE

## 2024-06-17 PROCEDURE — 83001 ASSAY OF GONADOTROPIN (FSH): CPT | Performed by: FAMILY MEDICINE

## 2024-06-17 PROCEDURE — 84402 ASSAY OF FREE TESTOSTERONE: CPT | Performed by: FAMILY MEDICINE

## 2024-06-17 PROCEDURE — 84146 ASSAY OF PROLACTIN: CPT | Performed by: FAMILY MEDICINE

## 2024-06-17 PROCEDURE — 82670 ASSAY OF TOTAL ESTRADIOL: CPT | Performed by: FAMILY MEDICINE

## 2024-06-18 ENCOUNTER — PATIENT MESSAGE (OUTPATIENT)
Dept: FAMILY MEDICINE | Facility: CLINIC | Age: 44
End: 2024-06-18
Payer: COMMERCIAL

## 2024-06-18 DIAGNOSIS — N95.1 MENOPAUSAL SYMPTOMS: Primary | ICD-10-CM

## 2024-06-19 ENCOUNTER — PATIENT MESSAGE (OUTPATIENT)
Dept: OBSTETRICS AND GYNECOLOGY | Facility: CLINIC | Age: 44
End: 2024-06-19
Payer: COMMERCIAL

## 2024-06-19 RX ORDER — NORGESTIMATE AND ETHINYL ESTRADIOL 7DAYSX3 LO
1 KIT ORAL DAILY
Qty: 30 TABLET | Refills: 11 | Status: SHIPPED | OUTPATIENT
Start: 2024-06-19 | End: 2024-06-21

## 2024-06-21 ENCOUNTER — OFFICE VISIT (OUTPATIENT)
Dept: OBSTETRICS AND GYNECOLOGY | Facility: CLINIC | Age: 44
End: 2024-06-21
Payer: COMMERCIAL

## 2024-06-21 DIAGNOSIS — E28.319 PREMATURE MENOPAUSE: Primary | ICD-10-CM

## 2024-06-21 LAB — TESTOST FREE SERPL-MCNC: <0.4 PG/ML

## 2024-06-21 RX ORDER — PROGESTERONE 100 MG/1
100 CAPSULE ORAL NIGHTLY
Qty: 90 CAPSULE | Refills: 3 | Status: SHIPPED | OUTPATIENT
Start: 2024-06-21 | End: 2025-06-21

## 2024-06-21 RX ORDER — ESTRADIOL 1 MG/1
1 TABLET ORAL DAILY
Qty: 90 TABLET | Refills: 3 | Status: SHIPPED | OUTPATIENT
Start: 2024-06-21 | End: 2025-06-21

## 2024-06-21 NOTE — PROGRESS NOTES
The patient location is: [place]  The chief complaint leading to consultation is: premature menopause    Visit type: audiovisual    Face to Face time with patient: 15 minutes  20 minutes of total time spent on the encounter, which includes face to face time and non-face to face time preparing to see the patient (eg, review of tests), Obtaining and/or reviewing separately obtained history, Documenting clinical information in the electronic or other health record, Independently interpreting results (not separately reported) and communicating results to the patient/family/caregiver, or Care coordination (not separately reported).         Each patient to whom he or she provides medical services by telemedicine is:  (1) informed of the relationship between the physician and patient and the respective role of any other health care provider with respect to management of the patient; and (2) notified that he or she may decline to receive medical services by telemedicine and may withdraw from such care at any time.    Notes: Here to discuss recent labs that confirmed she has premature menopause. Estradiol <10, FSH 66, Free testosterone <0.04. Was given low dose SSRI and OCP from PCP. Patient wants to discuss HRT instead. H/o hysterectomy. I agree HRT. Main complaints hot flashes, insomnia and joint pain. Explained in detail. RBA. All questions answered. Keep journal. F/u in 4-6 weeks for virtual to assess progress and make adjustments. Recommend not taking OCP or SSRI for now and try HRT first. All questions answered    1. Premature menopause  estradioL (ESTRACE) 1 MG tablet    progesterone (PROMETRIUM) 100 MG capsule

## 2024-07-16 ENCOUNTER — PATIENT MESSAGE (OUTPATIENT)
Dept: OBSTETRICS AND GYNECOLOGY | Facility: CLINIC | Age: 44
End: 2024-07-16
Payer: COMMERCIAL

## 2024-08-12 ENCOUNTER — OFFICE VISIT (OUTPATIENT)
Dept: PSYCHIATRY | Facility: CLINIC | Age: 44
End: 2024-08-12
Payer: COMMERCIAL

## 2024-08-12 DIAGNOSIS — F32.0 MDD (MAJOR DEPRESSIVE DISORDER), SINGLE EPISODE, MILD: Primary | ICD-10-CM

## 2024-08-12 DIAGNOSIS — F41.1 GAD (GENERALIZED ANXIETY DISORDER): ICD-10-CM

## 2024-08-12 DIAGNOSIS — Z63.0 MARITAL OR PARTNER RELATIONAL PROBLEM: ICD-10-CM

## 2024-08-12 PROCEDURE — 90791 PSYCH DIAGNOSTIC EVALUATION: CPT | Mod: 95,,, | Performed by: PSYCHOLOGIST

## 2024-08-12 PROCEDURE — 3044F HG A1C LEVEL LT 7.0%: CPT | Mod: CPTII,95,, | Performed by: PSYCHOLOGIST

## 2024-08-12 SDOH — SOCIAL DETERMINANTS OF HEALTH (SDOH): PROBLEMS IN RELATIONSHIP WITH SPOUSE OR PARTNER: Z63.0
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
## 2024-09-03 ENCOUNTER — PATIENT MESSAGE (OUTPATIENT)
Dept: OBSTETRICS AND GYNECOLOGY | Facility: CLINIC | Age: 44
End: 2024-09-03
Payer: COMMERCIAL

## 2024-09-18 ENCOUNTER — OFFICE VISIT (OUTPATIENT)
Dept: OBSTETRICS AND GYNECOLOGY | Facility: CLINIC | Age: 44
End: 2024-09-18
Attending: OBSTETRICS & GYNECOLOGY
Payer: COMMERCIAL

## 2024-09-18 DIAGNOSIS — E28.319 PREMATURE MENOPAUSE: Primary | ICD-10-CM

## 2024-09-18 PROCEDURE — 99213 OFFICE O/P EST LOW 20 MIN: CPT | Mod: 95,,, | Performed by: OBSTETRICS & GYNECOLOGY

## 2024-09-18 PROCEDURE — 1159F MED LIST DOCD IN RCRD: CPT | Mod: CPTII,95,, | Performed by: OBSTETRICS & GYNECOLOGY

## 2024-09-18 PROCEDURE — 1160F RVW MEDS BY RX/DR IN RCRD: CPT | Mod: CPTII,95,, | Performed by: OBSTETRICS & GYNECOLOGY

## 2024-09-18 PROCEDURE — 3044F HG A1C LEVEL LT 7.0%: CPT | Mod: CPTII,95,, | Performed by: OBSTETRICS & GYNECOLOGY

## 2024-09-18 RX ORDER — ESTRADIOL 2 MG/1
2 TABLET ORAL DAILY
Qty: 90 TABLET | Refills: 3 | Status: SHIPPED | OUTPATIENT
Start: 2024-09-18 | End: 2025-09-18

## 2024-09-18 RX ORDER — PROGESTERONE 200 MG/1
200 CAPSULE ORAL NIGHTLY
Qty: 90 CAPSULE | Refills: 3 | Status: SHIPPED | OUTPATIENT
Start: 2024-09-18 | End: 2025-09-18

## 2024-09-18 NOTE — PROGRESS NOTES
The patient location is: [place]  The chief complaint leading to consultation is: follow up HRT    Visit type: audiovisual    Face to Face time with patient: 7 min  10 minutes of total time spent on the encounter, which includes face to face time and non-face to face time preparing to see the patient (eg, review of tests), Obtaining and/or reviewing separately obtained history, Documenting clinical information in the electronic or other health record, Independently interpreting results (not separately reported) and communicating results to the patient/family/caregiver, or Care coordination (not separately reported).         Each patient to whom he or she provides medical services by telemedicine is:  (1) informed of the relationship between the physician and patient and the respective role of any other health care provider with respect to management of the patient; and (2) notified that he or she may decline to receive medical services by telemedicine and may withdraw from such care at any time.    Notes: Here for a follow up visit after initiation of HRT for premature menopause. Previous hysterectomy for fibroids. Says overall she is doing much better. Sleep is better, hot flashes are better, joint pains. Still having about 2-3 hot flashes and 2-3 night sweats. Discussed in detail. Will try increasing the dose. No side effects at all from the medication. All questions answered. She will message me with any issues.     1. Premature menopause  estradioL (ESTRACE) 2 MG tablet    progesterone (PROMETRIUM) 200 MG capsule

## 2024-12-02 ENCOUNTER — HOSPITAL ENCOUNTER (OUTPATIENT)
Dept: RADIOLOGY | Facility: OTHER | Age: 44
Discharge: HOME | End: 2024-12-02
Attending: FAMILY MEDICINE
Payer: COMMERCIAL

## 2024-12-02 DIAGNOSIS — Z12.31 ENCOUNTER FOR SCREENING MAMMOGRAM FOR BREAST CANCER: ICD-10-CM

## 2024-12-02 PROCEDURE — 77067 SCR MAMMO BI INCL CAD: CPT | Mod: TC

## 2024-12-02 PROCEDURE — 77067 SCR MAMMO BI INCL CAD: CPT | Mod: 26,,, | Performed by: RADIOLOGY

## 2024-12-02 PROCEDURE — 77063 BREAST TOMOSYNTHESIS BI: CPT | Mod: 26,,, | Performed by: RADIOLOGY

## 2025-01-06 ENCOUNTER — OFFICE VISIT (OUTPATIENT)
Dept: OBSTETRICS AND GYNECOLOGY | Facility: CLINIC | Age: 45
End: 2025-01-06
Payer: COMMERCIAL

## 2025-01-06 VITALS
WEIGHT: 185.44 LBS | SYSTOLIC BLOOD PRESSURE: 158 MMHG | DIASTOLIC BLOOD PRESSURE: 70 MMHG | BODY MASS INDEX: 30.85 KG/M2

## 2025-01-06 DIAGNOSIS — Z01.419 ENCOUNTER FOR GYNECOLOGICAL EXAMINATION (GENERAL) (ROUTINE) WITHOUT ABNORMAL FINDINGS: ICD-10-CM

## 2025-01-06 DIAGNOSIS — E28.319 PREMATURE MENOPAUSE: Primary | ICD-10-CM

## 2025-01-06 PROCEDURE — 1159F MED LIST DOCD IN RCRD: CPT | Mod: CPTII,S$GLB,, | Performed by: OBSTETRICS & GYNECOLOGY

## 2025-01-06 PROCEDURE — 99396 PREV VISIT EST AGE 40-64: CPT | Mod: S$GLB,,, | Performed by: OBSTETRICS & GYNECOLOGY

## 2025-01-06 PROCEDURE — 3078F DIAST BP <80 MM HG: CPT | Mod: CPTII,S$GLB,, | Performed by: OBSTETRICS & GYNECOLOGY

## 2025-01-06 PROCEDURE — 3077F SYST BP >= 140 MM HG: CPT | Mod: CPTII,S$GLB,, | Performed by: OBSTETRICS & GYNECOLOGY

## 2025-01-06 PROCEDURE — 1160F RVW MEDS BY RX/DR IN RCRD: CPT | Mod: CPTII,S$GLB,, | Performed by: OBSTETRICS & GYNECOLOGY

## 2025-01-06 PROCEDURE — 3008F BODY MASS INDEX DOCD: CPT | Mod: CPTII,S$GLB,, | Performed by: OBSTETRICS & GYNECOLOGY

## 2025-01-06 PROCEDURE — 99999 PR PBB SHADOW E&M-EST. PATIENT-LVL III: CPT | Mod: PBBFAC,,, | Performed by: OBSTETRICS & GYNECOLOGY

## 2025-01-06 NOTE — Clinical Note
Can you call in testosterone 2% to [facility] and show Marcella how to do it the next time you see her. thanks

## 2025-01-06 NOTE — PROGRESS NOTES
Subjective:       Patient ID: Maria Alejandra Perez is a 44 y.o. female.    Chief Complaint:  Well Woman        History of Present Illness  Maria Alejandra Perez is a 44 y.o. female  who presents for annual. Doing much better on the higher dose of HRT. Estradiol 2 mg and Prometrium 200 mg. The first couple of months had no hot flashes or night sweats. Now she has some occasional night sweats. Takes estrogen in the morning. Recommend try taking at night. Also decrease in sensation in the vulvar area and taking longer to climax. Discussed options, will try topical testosterone, rx to [facility]. All questions answered. Explained in detail.     No LMP recorded. Patient has had a hysterectomy.   Date of Last Pap: No result found    Review of Systems  Review of Systems   Constitutional:  Negative for chills and fever.        Objective:   Physical Exam:   Constitutional: She is oriented to person, place, and time. Vital signs are normal. She appears well-developed and well-nourished. No distress.        Pulmonary/Chest: She exhibits no mass. Right breast exhibits no mass, no nipple discharge, no skin change, no tenderness, no bleeding and no swelling. Left breast exhibits no mass, no nipple discharge, no skin change, no tenderness, no bleeding and no swelling. Breasts are symmetrical.        Abdominal: Soft. Bowel sounds are normal. She exhibits no distension and no mass. There is no abdominal tenderness. There is no rebound.     Genitourinary:    Vagina normal.   There is no rash, tenderness, lesion or injury on the right labia. There is no rash, tenderness, lesion or injury on the left labia. Right adnexum displays no mass, no tenderness and no fullness. Left adnexum displays no mass, no tenderness and no fullness. No erythema, vaginal discharge, tenderness, rectocele, cystocele or prolapse of vaginal walls in the vagina. Cervix is absent.Uterus is absent.           Musculoskeletal: Normal range of motion and moves all  extremeties.      Lymphadenopathy:        Right: No supraclavicular adenopathy present.        Left: No supraclavicular adenopathy present.    Neurological: She is alert and oriented to person, place, and time.    Skin: Skin is warm and dry.    Psychiatric: She has a normal mood and affect. Her behavior is normal. Judgment normal.        Assessment/ Plan:     No diagnosis found.    Follow up in about 1 year (around 1/6/2026) for Annual exam.    As of April 1, 2021, the Cures Act has been passed nationally. This new law requires that all doctors progress notes, lab results, pathology reports and radiology reports be released IMMEDIATELY to the patient in the patient portal. That means that the results are released to you at the EXACT same time they are released to me. Therefore, with all of the patients that I have I am not able to reply to each patient exactly when the results come in. So there will be a delay from when you see the results to when I see them and have time to come up with a response to send you. Also I only see these results when I am on the computer at work. So if the results come in over the weekend or after 5 pm of a work day, I will not see them until the next business day. As you can tell, this is a challenge as a physician to give every patient the quick response they hope for and deserve. So please be patient! Thanks for understanding, Dr. Metcalf

## 2025-01-07 ENCOUNTER — PATIENT MESSAGE (OUTPATIENT)
Dept: OBSTETRICS AND GYNECOLOGY | Facility: CLINIC | Age: 45
End: 2025-01-07
Payer: COMMERCIAL

## 2025-03-24 DIAGNOSIS — M25.551 PAIN OF RIGHT HIP: Primary | ICD-10-CM

## 2025-03-27 ENCOUNTER — OFFICE VISIT (OUTPATIENT)
Dept: ORTHOPEDICS | Facility: CLINIC | Age: 45
End: 2025-03-27
Attending: ORTHOPAEDIC SURGERY
Payer: COMMERCIAL

## 2025-03-27 ENCOUNTER — APPOINTMENT (OUTPATIENT)
Dept: RADIOLOGY | Facility: HOSPITAL | Age: 45
End: 2025-03-27
Attending: ORTHOPAEDIC SURGERY
Payer: COMMERCIAL

## 2025-03-27 VITALS — BODY MASS INDEX: 30.89 KG/M2 | WEIGHT: 185.44 LBS | HEIGHT: 65 IN

## 2025-03-27 DIAGNOSIS — M25.551 PAIN OF RIGHT HIP: ICD-10-CM

## 2025-03-27 DIAGNOSIS — M54.16 LUMBAR RADICULOPATHY: Primary | ICD-10-CM

## 2025-03-27 DIAGNOSIS — M25.551 PAIN OF RIGHT HIP: Primary | ICD-10-CM

## 2025-03-27 DIAGNOSIS — M54.16 LUMBAR RADICULOPATHY: ICD-10-CM

## 2025-03-27 PROCEDURE — 99204 OFFICE O/P NEW MOD 45 MIN: CPT | Mod: S$GLB,,, | Performed by: ORTHOPAEDIC SURGERY

## 2025-03-27 PROCEDURE — 3008F BODY MASS INDEX DOCD: CPT | Mod: CPTII,S$GLB,, | Performed by: ORTHOPAEDIC SURGERY

## 2025-03-27 PROCEDURE — 1159F MED LIST DOCD IN RCRD: CPT | Mod: CPTII,S$GLB,, | Performed by: ORTHOPAEDIC SURGERY

## 2025-03-27 PROCEDURE — 99999 PR PBB SHADOW E&M-EST. PATIENT-LVL III: CPT | Mod: PBBFAC,,, | Performed by: ORTHOPAEDIC SURGERY

## 2025-03-27 PROCEDURE — 73502 X-RAY EXAM HIP UNI 2-3 VIEWS: CPT | Mod: 26,RT,, | Performed by: RADIOLOGY

## 2025-03-27 PROCEDURE — 73502 X-RAY EXAM HIP UNI 2-3 VIEWS: CPT | Mod: TC,FY,PN,RT

## 2025-03-27 RX ORDER — METHYLPREDNISOLONE 4 MG/1
TABLET ORAL
Qty: 1 EACH | Refills: 0 | Status: SHIPPED | OUTPATIENT
Start: 2025-03-27

## 2025-03-27 NOTE — PROGRESS NOTES
NEW PATIENT ORTHOPAEDIC: HIP    PRIMARY CARE PHYSICIAN: Radha Dumont MD   REFERRING PROVIDER: Bhupendra George MD  [address] [facility]  [place]  [id number]     ASSESSMENT & PLAN:    Impression:  Right Lumbar Radiculopathy    Follow Up Plan: PRN     Non operative care:    Maria Alejandra Perez has physical exam evidence of above and wishes to pursue an non-operative care. I am recommending the following: medrol dose pack, referral to pain management    Patient comes in with a greater than six-month history of right leg pain.  It started off with her dancing 1 day and began to experience right-sided hip pain with intermittent radicular symptoms.  She is seeing chiropractors as well as undergone formal physical therapy.  This transiently helps with her pain but it has become progressive and now extends down her entire leg into her feet she has, burning pain, pins and needle pain.  She has x-rays today of her hip that do not demonstrate any significant findings.  She does not report a groin pain.  No pain with internal or external rotation of her hip.  She has reproduce pain and difficulty with forward flexion of her spine.  I think that she may be dealing with disc herniation or another lumbar based pathology that we would be reproducing this radicular type pain.  I am going to put her on a Medrol Dosepak temporarily to help with some of her pain but really I feel like she needs to be evaluated by pain management with x-rays or MRI to help determine etiology.    The patient has been ordered:  Pain Prescription    CONSULTS:   Pain Management     ACTIVE PROBLEM LIST  Problem List[1]        SUBJECTIVE    CHIEF COMPLAINT: Hip Pain    HPI:   Maria Alejandra Perez is a 44 y.o. female here for evaluation and management of right hip pain. There is a specific incident that brought about this pain. she has had progressive problems with the hip(s) starting 6 months ago and is progressing which is now interfering with activities which  include: functional household ADL's, enjoying hobbies, rising from a sitting position, and standing for prolonged periods of time    Currently the pain in the joint is moderate with activity.  The pain is intermittent and is located in buttocks and thigh.  The pain is described as burning, radiating, and shooting . Relieving factors include rest, over the counter medication , and repositioning. No associated Catching.     They do not report leg length inequality.     Maria Alejandra Perez has no additional complaints.     PROGRESSIVE SYMPTOMS:  Pain impacting sleep  Pain worsened by weight bearing  Pain effecting living situation    FUNCTIONAL STATUS:   Participate in recreational activities     PREVIOUS TREATMENTS:  Medical: OTC NSAIDS  Physical Therapy: Formal Physical Therapy for 6 weeks  Previous Orthopaedic Surgery: None    REVIEW OF SYSTEMS:  PAIN ASSESSMENT:  See HPI.  MUSCULOSKELETAL: See HPI.  OTHER 10 point review of systems is negative except as stated in HPI above    PAST MEDICAL HISTORY   has a past medical history of Acne (10/1/2018), Fibroids, and Right arm fracture.     PAST SURGICAL HISTORY   has a past surgical history that includes  section; Hysterectomy (); right arm surgery prior fracture (); and LEFT THUMB SURGERY.     FAMILY HISTORY  family history includes Cancer in her maternal grandfather and maternal grandmother; Hypertension in her father.     SOCIAL HISTORY   reports that she has quit smoking. Her smoking use included cigarettes. She has a 10 pack-year smoking history. She has never used smokeless tobacco. She reports current alcohol use. She reports that she does not use drugs.     ALLERGIES   Review of patient's allergies indicates:  No Known Allergies     MEDICATIONS   Medications Ordered Prior to Encounter[2]       PHYSICAL EXAM   vitals were not taken for this visit.   There is no height or weight on file to calculate BMI.      All other systems deferred.  GENERAL:  No  acute distress  HABITUS: Obese  GAIT: Non-antalgic  SKIN: Normal  and No erythema, warmth, fluctuance     HIP EXAM:    right:   ROM:   Flexion: 120 degrees    Internal Rotation: 30 degrees    External Rotation: 30 degrees    Abduction: 45 degrees    Adduction: 20 degrees  No apparent leg length discrepancy    Palpation: No tenderness over greater trochanter, SI joint, ilioposas, IT band, gluteal musculature   Pain is not reproduced with IR or ER of the hip  Strength: 5/5 hip flexion, abduction, knee flexion and extension   Straight leg raise: Negative   Neurovascular Status: Sensation intact to light touch in Sural, saphenous, SPN, DPN, Tibial nerve distribution  2+ pulse DP/PT, normal capillary refill, foot has normal warmth    DATA:  Diagnostic tests reviewed for today's visit:     Hip radiographs appears normal. No evidence of fracture, osseous abnormalities, or significant degenerative changes.         [1]   Patient Active Problem List  Diagnosis    H/O: hysterectomy    Chronic adenitis    Tonsillith    Hypertrophy of tonsil    Nasal septal deviation    Allergic rhinitis    Left knee pain    Synovial plica syndrome of left knee   [2]   Current Outpatient Medications on File Prior to Visit   Medication Sig Dispense Refill    BIOTIN ORAL Take 5,000 Units by mouth Daily.      estradioL (ESTRACE) 2 MG tablet Take 1 tablet (2 mg total) by mouth once daily. 90 tablet 3    prasterone, dhea, (DHEA) 25 mg Cap  (Patient not taking: Reported on 1/6/2025)      progesterone (PROMETRIUM) 200 MG capsule Take 1 capsule (200 mg total) by mouth every evening. 90 capsule 3     No current facility-administered medications on file prior to visit.

## 2025-04-01 ENCOUNTER — OFFICE VISIT (OUTPATIENT)
Dept: PAIN MEDICINE | Facility: CLINIC | Age: 45
End: 2025-04-01
Payer: COMMERCIAL

## 2025-04-01 VITALS
RESPIRATION RATE: 18 BRPM | OXYGEN SATURATION: 100 % | HEART RATE: 96 BPM | WEIGHT: 175.69 LBS | BODY MASS INDEX: 29.24 KG/M2 | DIASTOLIC BLOOD PRESSURE: 70 MMHG | SYSTOLIC BLOOD PRESSURE: 148 MMHG

## 2025-04-01 DIAGNOSIS — M54.16 LUMBAR RADICULOPATHY: ICD-10-CM

## 2025-04-01 DIAGNOSIS — M47.816 LUMBAR SPONDYLOSIS: ICD-10-CM

## 2025-04-01 DIAGNOSIS — M51.362 DEGENERATION OF INTERVERTEBRAL DISC OF LUMBAR REGION WITH DISCOGENIC BACK PAIN AND LOWER EXTREMITY PAIN: Primary | ICD-10-CM

## 2025-04-01 DIAGNOSIS — M54.16 LUMBAR RADICULOPATHY, CHRONIC: ICD-10-CM

## 2025-04-01 PROCEDURE — 99999 PR PBB SHADOW E&M-EST. PATIENT-LVL IV: CPT | Mod: PBBFAC,,, | Performed by: PAIN MEDICINE

## 2025-04-01 PROCEDURE — 1160F RVW MEDS BY RX/DR IN RCRD: CPT | Mod: CPTII,S$GLB,, | Performed by: PAIN MEDICINE

## 2025-04-01 PROCEDURE — 3078F DIAST BP <80 MM HG: CPT | Mod: CPTII,S$GLB,, | Performed by: PAIN MEDICINE

## 2025-04-01 PROCEDURE — 1159F MED LIST DOCD IN RCRD: CPT | Mod: CPTII,S$GLB,, | Performed by: PAIN MEDICINE

## 2025-04-01 PROCEDURE — 99204 OFFICE O/P NEW MOD 45 MIN: CPT | Mod: S$GLB,,, | Performed by: PAIN MEDICINE

## 2025-04-01 PROCEDURE — 3008F BODY MASS INDEX DOCD: CPT | Mod: CPTII,S$GLB,, | Performed by: PAIN MEDICINE

## 2025-04-01 PROCEDURE — 3077F SYST BP >= 140 MM HG: CPT | Mod: CPTII,S$GLB,, | Performed by: PAIN MEDICINE

## 2025-04-01 NOTE — PROGRESS NOTES
Subjective:     Patient ID: Maria Alejandra Perez is a 44 y.o. female    Chief Complaint: Low-back Pain (Right sided burning and throbbing radiating into leg )      Referred by: Bhupendra George MD      HPI:    Initial Encounter (4/1/25):  Maria Alejandra Perez is a 44 y.o. female who presents today with right-sided low back and lower extremity pain.  This pain has been present for 4-5 months.  No specific inciting events or injuries noted.  Pain is located in the right lower lumbar/lumbosacral region radiate to the right lower extremity all the way to her foot with associated paresthesias.  Patient is also noticing some difficulty walking on her heel on the right.  She denies any associated bowel bladder dysfunction.  Pain is constant.  Somewhat improved with activity.  Worsened with initiating activity after rest.   This pain is described in detail below.    Physical Therapy:  Yes.    Non-pharmacologic Treatment:  Rest helps         TENS?  No    Pain Medications:         Currently taking:  Nothing    Has tried in the past:  NSAIDs, Tylenol    Has not tried:  Opioids, Muscle relaxants, TCAs, SNRIs, anticonvulsants, topical creams    Blood thinners:  None    Interventional Therapies:  None    Relevant Surgeries:  None    Affecting sleep?  Yes    Affecting daily activities? yes    Depressive symptoms? No          SI/HI? No    Work status: Employed    Pain Scores:    Best:       3/10  Worst:     10/10  Usually:   7/10  Today:    7/10    Pain Disability Index  Family/Home Responsibilities:: 6  Recreation:: 2  Social Activity:: 7  Occupation:: 8  Sexual Behavior:: 8  Self Care:: 6  Life-Support Activities:: 9  Pain Disability Index (PDI): 46    Review of Systems   Constitutional:  Negative for activity change, appetite change, chills, fatigue, fever and unexpected weight change.   HENT:  Negative for hearing loss.    Eyes:  Negative for visual disturbance.   Respiratory:  Negative for chest tightness and shortness of breath.     Cardiovascular:  Negative for chest pain.   Gastrointestinal:  Negative for abdominal pain, constipation, diarrhea, nausea and vomiting.   Genitourinary:  Negative for difficulty urinating.   Musculoskeletal:  Positive for back pain, gait problem and myalgias. Negative for neck pain.   Skin:  Negative for rash.   Neurological:  Positive for weakness and numbness. Negative for dizziness, light-headedness and headaches.   Psychiatric/Behavioral:  Positive for sleep disturbance. Negative for hallucinations and suicidal ideas. The patient is not nervous/anxious.        Past Medical History:   Diagnosis Date    Acne 10/1/2018    Resolved through oral and topical medications    Fibroids     Right arm fracture        Past Surgical History:   Procedure Laterality Date     SECTION      HYSTERECTOMY      DV, no BSO for fibroids. Dr. Metcalf    LEFT THUMB SURGERY      right arm surgery prior fracture         Social History[1]    Review of patient's allergies indicates:  No Known Allergies    Medications Ordered Prior to Encounter[2]    Objective:      BP (!) 148/70 (BP Location: Left arm, Patient Position: Sitting)   Pulse 96   Resp 18   Wt 79.7 kg (175 lb 11.3 oz)   SpO2 100%   BMI 29.24 kg/m²     Exam:  GEN:  Well developed, well nourished.  No acute distress.  Normal pain behavior.  HEENT:  No trauma.  Mucous membranes moist.  Nares patent bilaterally.  PSYCH: Normal affect. Thought content appropriate.  CHEST:  Breathing symmetric.  No audible wheezing.  ABD: Soft, non-distended.  SKIN:  Warm, pink, dry.  No rash on exposed areas.    EXT:  No cyanosis, clubbing, or edema.  No color change or changes in nail or hair growth.  NEURO/MUSCULOSKELETAL:  Fully alert, oriented, and appropriate. Speech normal celia. No cranial nerve deficits.   Gait:  Normal.  No trendelenburg sign bilaterally.   Motor Strength:  4/5 right ankle dorsiflexion (difficulty heel walking); otherwise 5/5 motor strength throughout  lower extremities.   Sensory:  No sensory deficit in the lower extremities.   Reflexes:  2+ and symmetric throughout. No clonus or spasticity.  L-Spine:  Full ROM with pain on extension more than flexion.  Negative pain with axial/facet loading bilaterally.  Positive SLR on the right.    No TTP over lumbar paraspinals, bilateral SI joints, hips, piriformis muscles, or GTB.        Imaging:  No pertinent imaging    Assessment:       Encounter Diagnoses   Name Primary?    Lumbar radiculopathy     Degeneration of intervertebral disc of lumbar region with discogenic back pain and lower extremity pain Yes    Lumbar spondylosis     Lumbar radiculopathy, chronic          Plan:       Maria Alejandra was seen today for low-back pain.    Diagnoses and all orders for this visit:    Degeneration of intervertebral disc of lumbar region with discogenic back pain and lower extremity pain  -     MRI Lumbar Spine Without Contrast; Future    Lumbar radiculopathy  -     Ambulatory referral/consult to Pain Clinic  -     MRI Lumbar Spine Without Contrast; Future    Lumbar spondylosis  -     MRI Lumbar Spine Without Contrast; Future    Lumbar radiculopathy, chronic  -     MRI Lumbar Spine Without Contrast; Future        Maria Alejandra Brian Perez is a 44 y.o. female with chronic right-sided low back and lower extremity pain.  Associated numbness and weakness.  Concerning for right L5 radiculopathy..    Lumbar MRI to evaluate for radiculopathy.    We did discuss gabapentin as a potential option, patient states that she would prefer not to utilize oral medications.    May consider additional physical therapy, but given presence of right ankle dorsiflexion weakness, I would like to get imaging to rule out severe nerve root compression.    Return to clinic after MRI to review results.  May consider epidural steroid injection and/or physical therapy.            This note was created by combination of typed  and M-Modal dictation. Transcription and  phonetic errors may be present.  If there are any questions, please contact me.           [1]   Social History  Socioeconomic History    Marital status:    Tobacco Use    Smoking status: Former     Current packs/day: 1.00     Average packs/day: 1 pack/day for 10.0 years (10.0 ttl pk-yrs)     Types: Cigarettes    Smokeless tobacco: Never   Substance and Sexual Activity    Alcohol use: Yes     Alcohol/week: 0.0 standard drinks of alcohol     Comment: social    Drug use: No    Sexual activity: Yes     Partners: Male     Birth control/protection: See Surgical Hx     Comment:       Social Drivers of Health     Financial Resource Strain: Low Risk  (6/10/2024)    Overall Financial Resource Strain (CARDIA)     Difficulty of Paying Living Expenses: Not hard at all   Food Insecurity: No Food Insecurity (6/10/2024)    Hunger Vital Sign     Worried About Running Out of Food in the Last Year: Never true     Ran Out of Food in the Last Year: Never true   Physical Activity: Sufficiently Active (6/10/2024)    Exercise Vital Sign     Days of Exercise per Week: 7 days     Minutes of Exercise per Session: 50 min   Stress: Stress Concern Present (6/10/2024)    [employer] [place] [employer] - Occupational Stress Questionnaire     Feeling of Stress : Very much   Housing Stability: Unknown (6/10/2024)    Housing Stability Vital Sign     Unable to Pay for Housing in the Last Year: No   [2]   Current Outpatient Medications on File Prior to Visit   Medication Sig Dispense Refill    BIOTIN ORAL Take 5,000 Units by mouth Daily.      estradioL (ESTRACE) 2 MG tablet Take 1 tablet (2 mg total) by mouth once daily. 90 tablet 3    methylPREDNISolone (MEDROL DOSEPACK) 4 mg tablet use as directed 1 each 0    progesterone (PROMETRIUM) 200 MG capsule Take 1 capsule (200 mg total) by mouth every evening. 90 capsule 3    [DISCONTINUED] prasterone, dhea, (DHEA) 25 mg Cap  (Patient not taking: Reported on 1/6/2025)       No current  facility-administered medications on file prior to visit.

## 2025-04-06 ENCOUNTER — HOSPITAL ENCOUNTER (OUTPATIENT)
Dept: RADIOLOGY | Facility: HOSPITAL | Age: 45
Discharge: HOME | End: 2025-04-06
Attending: PAIN MEDICINE
Payer: COMMERCIAL

## 2025-04-06 DIAGNOSIS — M51.362 DEGENERATION OF INTERVERTEBRAL DISC OF LUMBAR REGION WITH DISCOGENIC BACK PAIN AND LOWER EXTREMITY PAIN: ICD-10-CM

## 2025-04-06 DIAGNOSIS — M47.816 LUMBAR SPONDYLOSIS: ICD-10-CM

## 2025-04-06 DIAGNOSIS — M54.16 LUMBAR RADICULOPATHY, CHRONIC: ICD-10-CM

## 2025-04-06 DIAGNOSIS — M54.16 LUMBAR RADICULOPATHY: ICD-10-CM

## 2025-04-06 PROCEDURE — 72148 MRI LUMBAR SPINE W/O DYE: CPT | Mod: 26,,, | Performed by: RADIOLOGY

## 2025-04-06 PROCEDURE — 72148 MRI LUMBAR SPINE W/O DYE: CPT | Mod: TC

## 2025-04-07 ENCOUNTER — OFFICE VISIT (OUTPATIENT)
Dept: PAIN MEDICINE | Facility: CLINIC | Age: 45
End: 2025-04-07
Payer: COMMERCIAL

## 2025-04-07 VITALS — DIASTOLIC BLOOD PRESSURE: 82 MMHG | HEART RATE: 102 BPM | SYSTOLIC BLOOD PRESSURE: 158 MMHG | OXYGEN SATURATION: 98 %

## 2025-04-07 DIAGNOSIS — M51.362 DEGENERATION OF INTERVERTEBRAL DISC OF LUMBAR REGION WITH DISCOGENIC BACK PAIN AND LOWER EXTREMITY PAIN: Primary | ICD-10-CM

## 2025-04-07 DIAGNOSIS — M47.816 LUMBAR SPONDYLOSIS: ICD-10-CM

## 2025-04-07 DIAGNOSIS — M71.38 SYNOVIAL CYST OF LUMBAR FACET JOINT: ICD-10-CM

## 2025-04-07 DIAGNOSIS — M54.16 LUMBAR RADICULOPATHY: ICD-10-CM

## 2025-04-07 PROCEDURE — 99214 OFFICE O/P EST MOD 30 MIN: CPT | Mod: S$GLB,,,

## 2025-04-07 PROCEDURE — 99999 PR PBB SHADOW E&M-EST. PATIENT-LVL IV: CPT | Mod: PBBFAC,,,

## 2025-04-07 PROCEDURE — 1159F MED LIST DOCD IN RCRD: CPT | Mod: CPTII,S$GLB,,

## 2025-04-07 PROCEDURE — 1160F RVW MEDS BY RX/DR IN RCRD: CPT | Mod: CPTII,S$GLB,,

## 2025-04-07 PROCEDURE — 3079F DIAST BP 80-89 MM HG: CPT | Mod: CPTII,S$GLB,,

## 2025-04-07 PROCEDURE — 3077F SYST BP >= 140 MM HG: CPT | Mod: CPTII,S$GLB,,

## 2025-04-07 RX ORDER — GABAPENTIN 300 MG/1
600 CAPSULE ORAL 3 TIMES DAILY
Qty: 180 CAPSULE | Refills: 5 | Status: SHIPPED | OUTPATIENT
Start: 2025-04-07 | End: 2025-10-04

## 2025-04-07 NOTE — PROGRESS NOTES
Subjective:     Patient ID: Maria Alejandra Perez is a 44 y.o. female    Chief Complaint: Follow-up      Referred by: No ref. provider found      HPI:    Interval History PA (04/07/2025):  Patient returns to clinic for follow up of right-sided lower back and extremity pain and MRI review.  Patient denies significant changes in the quality or location of her pain since last encounter.  Locates pain over right lower lumbar/lumbosacral region radiating into right lower extremity all the way down to her foot with associated paresthesia.  She does note some difficulty walking due to pain.  Denies focal weakness although states when walking on her right foot she does have difficulty controlling specific muscles.  Denies bowel or bladder dysfunction.    Initial Encounter (4/1/25):  Maria Alejandra Perez is a 44 y.o. female who presents today with right-sided low back and lower extremity pain.  This pain has been present for 4-5 months.  No specific inciting events or injuries noted.  Pain is located in the right lower lumbar/lumbosacral region radiate to the right lower extremity all the way to her foot with associated paresthesias.  Patient is also noticing some difficulty walking on her heel on the right.  She denies any associated bowel bladder dysfunction.  Pain is constant.  Somewhat improved with activity.  Worsened with initiating activity after rest.   This pain is described in detail below.    Physical Therapy:  Yes.    Non-pharmacologic Treatment:  Rest helps         TENS?  No    Pain Medications:         Currently taking:  Nothing    Has tried in the past:  NSAIDs, Tylenol    Has not tried:  Opioids, Muscle relaxants, TCAs, SNRIs, anticonvulsants, topical creams    Blood thinners:  None    Interventional Therapies:  None    Relevant Surgeries:  None    Affecting sleep?  Yes    Affecting daily activities? yes    Depressive symptoms? No          SI/HI? No    Work status: Employed    Pain Scores:    Best:       2/10  Worst:      9/10  Usually:   10  Today:    5/10    Pain Disability Index  Family/Home Responsibilities:: 6  Recreation:: 8  Social Activity:: 4  Occupation:: 0  Sexual Behavior:: 8  Self Care:: 7  Life-Support Activities:: 8  Pain Disability Index (PDI): 41    Review of Systems   Constitutional:  Negative for activity change, appetite change, chills, fatigue, fever and unexpected weight change.   HENT:  Negative for hearing loss.    Eyes:  Negative for visual disturbance.   Respiratory:  Negative for chest tightness and shortness of breath.    Cardiovascular:  Negative for chest pain.   Gastrointestinal:  Negative for abdominal pain, constipation, diarrhea, nausea and vomiting.   Genitourinary:  Negative for difficulty urinating.   Musculoskeletal:  Positive for back pain, gait problem and myalgias. Negative for neck pain.   Skin:  Negative for rash.   Neurological:  Positive for weakness and numbness. Negative for dizziness, light-headedness and headaches.   Psychiatric/Behavioral:  Positive for sleep disturbance. Negative for hallucinations and suicidal ideas. The patient is not nervous/anxious.        Past Medical History:   Diagnosis Date    Acne 10/1/2018    Resolved through oral and topical medications    Fibroids     Right arm fracture        Past Surgical History:   Procedure Laterality Date     SECTION      HYSTERECTOMY      DVH, no BSO for fibroids. Dr. Metcalf    LEFT THUMB SURGERY      right arm surgery prior fracture  1986       Social History[1]    Review of patient's allergies indicates:  No Known Allergies    Medications Ordered Prior to Encounter[2]    Objective:      BP (!) 158/82 (BP Location: Left arm, Patient Position: Sitting)   Pulse 102   SpO2 98%     Exam:  GEN:  Well developed, well nourished.  No acute distress.  Normal pain behavior.  HEENT:  No trauma.  Mucous membranes moist.  Nares patent bilaterally.  PSYCH: Normal affect. Thought content appropriate.  CHEST:  Breathing  symmetric.  No audible wheezing.  ABD: Soft, non-distended.  SKIN:  Warm, pink, dry.  No rash on exposed areas.    EXT:  No cyanosis, clubbing, or edema.  No color change or changes in nail or hair growth.  NEURO/MUSCULOSKELETAL:  Fully alert, oriented, and appropriate. Speech normal celia. No cranial nerve deficits.   Gait:  Normal.  No trendelenburg sign bilaterally.   Motor Strength:  4/5 right ankle dorsiflexion (difficulty heel walking); otherwise 5/5 motor strength throughout lower extremities.   Sensory:  No sensory deficit in the lower extremities.   Reflexes:  2+ and symmetric throughout. No clonus or spasticity.  L-Spine:  Full ROM with pain on extension.  Negative pain with axial/facet loading bilaterally.  Positive SLR on the right.    No TTP over lumbar paraspinals, bilateral SI joints, hips, piriformis muscles, or GTB.        Imaging:  Narrative & Impression  EXAMINATION:  MRI LUMBAR SPINE WITHOUT CONTRAST     CLINICAL HISTORY:  Lumbar radiculopathy, symptoms persist with conservative treatment; Radiculopathy, lumbar region     TECHNIQUE:  Multiplanar, multisequence MR images were acquired from the thoracolumbar junction to the sacrum without the administration of contrast.     FINDINGS:  Alignment: Normal.     Vertebrae: 5 lumbar-type vertebral bodies. No aggressive marrow replacement process or fracture.Modic Changes: Type 2 (fat endplate signal) Level: L5-S1     Discs: Desiccation of disc material L5-S1 with annular fissure     Cord: Normal. Conus terminates at L1.     Degenerative findings:     T12-L1: There is no focal disc herniation. No significant central canal narrowing . No significant neural foraminal narrowing.     L1-L2: There is no focal disc herniation. No significant central canal narrowing . No significant neural foraminal narrowing.     L2-L3: There is no focal disc herniation. No significant central canal narrowing . No significant neural foraminal narrowing.     L3-L4: There is no  focal disc herniation. No significant central canal narrowing . No significant neural foraminal narrowing.     L4-L5: There is no focal disc herniation.7 mm synovial cyst RIGHT sided facet (axial image 35 series 13 sagittal series 5, image 7 with encroachment upon the RIGHT lateral recess and suspected mass-effect upon the RIGHT L5 nerve root.  Low volume facet effusions.  No significant neural foraminal narrowing.     L5-S1: There is no focal disc herniation. No significant central canal narrowing . No significant neural foraminal narrowing.     Paraspinal muscles & soft tissues: Unremarkable.     Impression:     7 mm RIGHT-sided synovial cyst/facet cyst L4-L5 as above.        Electronically signed by:Kaz Diaz MD  Date:                                            04/06/2025  Time:                                           11:24    Assessment:       Encounter Diagnoses   Name Primary?    Degeneration of intervertebral disc of lumbar region with discogenic back pain and lower extremity pain Yes    Lumbar spondylosis     Lumbar radiculopathy     Synovial cyst of lumbar facet joint          Plan:       Maria Alejandra was seen today for follow-up.    Diagnoses and all orders for this visit:    Degeneration of intervertebral disc of lumbar region with discogenic back pain and lower extremity pain  -     Ambulatory referral/consult to Neurosurgery; Future  -     X-Ray Lumbar Complete Including Flex And Ext; Future  -     gabapentin (NEURONTIN) 300 MG capsule; Take 2 capsules (600 mg total) by mouth 3 (three) times daily.  -     Case Request Endoscopy: Right L5 Transforaminal Epidural Steroid Injection with right L4-5 Facet Injection/Cyst Rupture    Lumbar spondylosis    Lumbar radiculopathy  -     Ambulatory referral/consult to Neurosurgery; Future  -     gabapentin (NEURONTIN) 300 MG capsule; Take 2 capsules (600 mg total) by mouth 3 (three) times daily.  -     Case Request Endoscopy: Right L5 Transforaminal Epidural  Steroid Injection with right L4-5 Facet Injection/Cyst Rupture    Synovial cyst of lumbar facet joint  -     Ambulatory referral/consult to Neurosurgery; Future  -     X-Ray Lumbar Complete Including Flex And Ext; Future  -     gabapentin (NEURONTIN) 300 MG capsule; Take 2 capsules (600 mg total) by mouth 3 (three) times daily.  -     Case Request Endoscopy: Right L5 Transforaminal Epidural Steroid Injection with right L4-5 Facet Injection/Cyst Rupture        Maria Alejandra Perez is a 44 y.o. female with chronic right-sided low back and lower extremity pain.  Associated numbness and weakness.  Concerning for right L5 radiculopathy.  Lumbar MRI showing a large 7 mm synovial cyst from the right L4-5 facet joint.  Cyst is causing compression of the right L5 nerve root.    Pertinent imaging studies reviewed by me. Imaging results were discussed with patient.  Schedule for a right L5 transforaminal epidural steroid injection with a right L4-5 facet injections with attempted cyst rupture.  Referred to Neurosurgery for further evaluation.  Given the size of patient's synovial cyst and likelihood of recurrence we will have her establish with Neurosurgery.  Lumbar x-ray with flexion/extension to rule out instability.  Start gabapentin 300 mg q.h.s., increase to 600 mg t.i.d. as tolerated/needed.  Patient given instructions on how to increase dosage.  Sedation precautions discussed.  Return to clinic after procedure to discuss efficacy.      This note was created by combination of typed  and M-Modal dictation. Transcription and phonetic errors may be present.  If there are any questions, please contact me.             [1]   Social History  Socioeconomic History    Marital status:    Tobacco Use    Smoking status: Former     Current packs/day: 1.00     Average packs/day: 1 pack/day for 10.0 years (10.0 ttl pk-yrs)     Types: Cigarettes    Smokeless tobacco: Never   Substance and Sexual Activity    Alcohol use: Yes      Alcohol/week: 0.0 standard drinks of alcohol     Comment: social    Drug use: No    Sexual activity: Yes     Partners: Male     Birth control/protection: See Surgical Hx     Comment:       Social Drivers of Health     Financial Resource Strain: Medium Risk (4/2/2025)    Overall Financial Resource Strain (CARDIA)     Difficulty of Paying Living Expenses: Somewhat hard   Food Insecurity: No Food Insecurity (4/2/2025)    Hunger Vital Sign     Worried About Running Out of Food in the Last Year: Never true     Ran Out of Food in the Last Year: Never true   Transportation Needs: No Transportation Needs (4/2/2025)    PRAPARE - Transportation     Lack of Transportation (Medical): No     Lack of Transportation (Non-Medical): No   Physical Activity: Sufficiently Active (4/2/2025)    Exercise Vital Sign     Days of Exercise per Week: 7 days     Minutes of Exercise per Session: 60 min   Stress: No Stress Concern Present (4/2/2025)    [employer] [employer] - Occupational Stress Questionnaire     Feeling of Stress : Only a little   Housing Stability: Low Risk  (4/2/2025)    Housing Stability Vital Sign     Unable to Pay for Housing in the Last Year: No     Number of Times Moved in the Last Year: 0     Homeless in the Last Year: No   [2]   Current Outpatient Medications on File Prior to Visit   Medication Sig Dispense Refill    BIOTIN ORAL Take 5,000 Units by mouth Daily.      estradioL (ESTRACE) 2 MG tablet Take 1 tablet (2 mg total) by mouth once daily. 90 tablet 3    progesterone (PROMETRIUM) 200 MG capsule Take 1 capsule (200 mg total) by mouth every evening. 90 capsule 3    methylPREDNISolone (MEDROL DOSEPACK) 4 mg tablet use as directed 1 each 0     No current facility-administered medications on file prior to visit.

## 2025-04-07 NOTE — H&P (VIEW-ONLY)
Subjective:     Patient ID: Maria Alejandra Perez is a 44 y.o. female    Chief Complaint: Follow-up      Referred by: No ref. provider found      HPI:    Interval History PA (04/07/2025):  Patient returns to clinic for follow up of right-sided lower back and extremity pain and MRI review.  Patient denies significant changes in the quality or location of her pain since last encounter.  Locates pain over right lower lumbar/lumbosacral region radiating into right lower extremity all the way down to her foot with associated paresthesia.  She does note some difficulty walking due to pain.  Denies focal weakness although states when walking on her right foot she does have difficulty controlling specific muscles.  Denies bowel or bladder dysfunction.    Initial Encounter (4/1/25):  Maria Alejandra Perez is a 44 y.o. female who presents today with right-sided low back and lower extremity pain.  This pain has been present for 4-5 months.  No specific inciting events or injuries noted.  Pain is located in the right lower lumbar/lumbosacral region radiate to the right lower extremity all the way to her foot with associated paresthesias.  Patient is also noticing some difficulty walking on her heel on the right.  She denies any associated bowel bladder dysfunction.  Pain is constant.  Somewhat improved with activity.  Worsened with initiating activity after rest.   This pain is described in detail below.    Physical Therapy:  Yes.    Non-pharmacologic Treatment:  Rest helps         TENS?  No    Pain Medications:         Currently taking:  Nothing    Has tried in the past:  NSAIDs, Tylenol    Has not tried:  Opioids, Muscle relaxants, TCAs, SNRIs, anticonvulsants, topical creams    Blood thinners:  None    Interventional Therapies:  None    Relevant Surgeries:  None    Affecting sleep?  Yes    Affecting daily activities? yes    Depressive symptoms? No          SI/HI? No    Work status: Employed    Pain Scores:    Best:       2/10  Worst:      9/10  Usually:   10  Today:    5/10    Pain Disability Index  Family/Home Responsibilities:: 6  Recreation:: 8  Social Activity:: 4  Occupation:: 0  Sexual Behavior:: 8  Self Care:: 7  Life-Support Activities:: 8  Pain Disability Index (PDI): 41    Review of Systems   Constitutional:  Negative for activity change, appetite change, chills, fatigue, fever and unexpected weight change.   HENT:  Negative for hearing loss.    Eyes:  Negative for visual disturbance.   Respiratory:  Negative for chest tightness and shortness of breath.    Cardiovascular:  Negative for chest pain.   Gastrointestinal:  Negative for abdominal pain, constipation, diarrhea, nausea and vomiting.   Genitourinary:  Negative for difficulty urinating.   Musculoskeletal:  Positive for back pain, gait problem and myalgias. Negative for neck pain.   Skin:  Negative for rash.   Neurological:  Positive for weakness and numbness. Negative for dizziness, light-headedness and headaches.   Psychiatric/Behavioral:  Positive for sleep disturbance. Negative for hallucinations and suicidal ideas. The patient is not nervous/anxious.        Past Medical History:   Diagnosis Date    Acne 10/1/2018    Resolved through oral and topical medications    Fibroids     Right arm fracture        Past Surgical History:   Procedure Laterality Date     SECTION      HYSTERECTOMY      DVH, no BSO for fibroids. Dr. Metcalf    LEFT THUMB SURGERY      right arm surgery prior fracture  1986       Social History[1]    Review of patient's allergies indicates:  No Known Allergies    Medications Ordered Prior to Encounter[2]    Objective:      BP (!) 158/82 (BP Location: Left arm, Patient Position: Sitting)   Pulse 102   SpO2 98%     Exam:  GEN:  Well developed, well nourished.  No acute distress.  Normal pain behavior.  HEENT:  No trauma.  Mucous membranes moist.  Nares patent bilaterally.  PSYCH: Normal affect. Thought content appropriate.  CHEST:  Breathing  symmetric.  No audible wheezing.  ABD: Soft, non-distended.  SKIN:  Warm, pink, dry.  No rash on exposed areas.    EXT:  No cyanosis, clubbing, or edema.  No color change or changes in nail or hair growth.  NEURO/MUSCULOSKELETAL:  Fully alert, oriented, and appropriate. Speech normal celia. No cranial nerve deficits.   Gait:  Normal.  No trendelenburg sign bilaterally.   Motor Strength:  4/5 right ankle dorsiflexion (difficulty heel walking); otherwise 5/5 motor strength throughout lower extremities.   Sensory:  No sensory deficit in the lower extremities.   Reflexes:  2+ and symmetric throughout. No clonus or spasticity.  L-Spine:  Full ROM with pain on extension.  Negative pain with axial/facet loading bilaterally.  Positive SLR on the right.    No TTP over lumbar paraspinals, bilateral SI joints, hips, piriformis muscles, or GTB.        Imaging:  Narrative & Impression  EXAMINATION:  MRI LUMBAR SPINE WITHOUT CONTRAST     CLINICAL HISTORY:  Lumbar radiculopathy, symptoms persist with conservative treatment; Radiculopathy, lumbar region     TECHNIQUE:  Multiplanar, multisequence MR images were acquired from the thoracolumbar junction to the sacrum without the administration of contrast.     FINDINGS:  Alignment: Normal.     Vertebrae: 5 lumbar-type vertebral bodies. No aggressive marrow replacement process or fracture.Modic Changes: Type 2 (fat endplate signal) Level: L5-S1     Discs: Desiccation of disc material L5-S1 with annular fissure     Cord: Normal. Conus terminates at L1.     Degenerative findings:     T12-L1: There is no focal disc herniation. No significant central canal narrowing . No significant neural foraminal narrowing.     L1-L2: There is no focal disc herniation. No significant central canal narrowing . No significant neural foraminal narrowing.     L2-L3: There is no focal disc herniation. No significant central canal narrowing . No significant neural foraminal narrowing.     L3-L4: There is no  focal disc herniation. No significant central canal narrowing . No significant neural foraminal narrowing.     L4-L5: There is no focal disc herniation.7 mm synovial cyst RIGHT sided facet (axial image 35 series 13 sagittal series 5, image 7 with encroachment upon the RIGHT lateral recess and suspected mass-effect upon the RIGHT L5 nerve root.  Low volume facet effusions.  No significant neural foraminal narrowing.     L5-S1: There is no focal disc herniation. No significant central canal narrowing . No significant neural foraminal narrowing.     Paraspinal muscles & soft tissues: Unremarkable.     Impression:     7 mm RIGHT-sided synovial cyst/facet cyst L4-L5 as above.        Electronically signed by:Kaz Diaz MD  Date:                                            04/06/2025  Time:                                           11:24    Assessment:       Encounter Diagnoses   Name Primary?    Degeneration of intervertebral disc of lumbar region with discogenic back pain and lower extremity pain Yes    Lumbar spondylosis     Lumbar radiculopathy     Synovial cyst of lumbar facet joint          Plan:       Maria Alejandra was seen today for follow-up.    Diagnoses and all orders for this visit:    Degeneration of intervertebral disc of lumbar region with discogenic back pain and lower extremity pain  -     Ambulatory referral/consult to Neurosurgery; Future  -     X-Ray Lumbar Complete Including Flex And Ext; Future  -     gabapentin (NEURONTIN) 300 MG capsule; Take 2 capsules (600 mg total) by mouth 3 (three) times daily.  -     Case Request Endoscopy: Right L5 Transforaminal Epidural Steroid Injection with right L4-5 Facet Injection/Cyst Rupture    Lumbar spondylosis    Lumbar radiculopathy  -     Ambulatory referral/consult to Neurosurgery; Future  -     gabapentin (NEURONTIN) 300 MG capsule; Take 2 capsules (600 mg total) by mouth 3 (three) times daily.  -     Case Request Endoscopy: Right L5 Transforaminal Epidural  Steroid Injection with right L4-5 Facet Injection/Cyst Rupture    Synovial cyst of lumbar facet joint  -     Ambulatory referral/consult to Neurosurgery; Future  -     X-Ray Lumbar Complete Including Flex And Ext; Future  -     gabapentin (NEURONTIN) 300 MG capsule; Take 2 capsules (600 mg total) by mouth 3 (three) times daily.  -     Case Request Endoscopy: Right L5 Transforaminal Epidural Steroid Injection with right L4-5 Facet Injection/Cyst Rupture        Maria Alejandra Perez is a 44 y.o. female with chronic right-sided low back and lower extremity pain.  Associated numbness and weakness.  Concerning for right L5 radiculopathy.  Lumbar MRI showing a large 7 mm synovial cyst from the right L4-5 facet joint.  Cyst is causing compression of the right L5 nerve root.    Pertinent imaging studies reviewed by me. Imaging results were discussed with patient.  Schedule for a right L5 transforaminal epidural steroid injection with a right L4-5 facet injections with attempted cyst rupture.  Referred to Neurosurgery for further evaluation.  Given the size of patient's synovial cyst and likelihood of recurrence we will have her establish with Neurosurgery.  Lumbar x-ray with flexion/extension to rule out instability.  Start gabapentin 300 mg q.h.s., increase to 600 mg t.i.d. as tolerated/needed.  Patient given instructions on how to increase dosage.  Sedation precautions discussed.  Return to clinic after procedure to discuss efficacy.      This note was created by combination of typed  and M-Modal dictation. Transcription and phonetic errors may be present.  If there are any questions, please contact me.             [1]   Social History  Socioeconomic History    Marital status:    Tobacco Use    Smoking status: Former     Current packs/day: 1.00     Average packs/day: 1 pack/day for 10.0 years (10.0 ttl pk-yrs)     Types: Cigarettes    Smokeless tobacco: Never   Substance and Sexual Activity    Alcohol use: Yes      Alcohol/week: 0.0 standard drinks of alcohol     Comment: social    Drug use: No    Sexual activity: Yes     Partners: Male     Birth control/protection: See Surgical Hx     Comment:       Social Drivers of Health     Financial Resource Strain: Medium Risk (4/2/2025)    Overall Financial Resource Strain (CARDIA)     Difficulty of Paying Living Expenses: Somewhat hard   Food Insecurity: No Food Insecurity (4/2/2025)    Hunger Vital Sign     Worried About Running Out of Food in the Last Year: Never true     Ran Out of Food in the Last Year: Never true   Transportation Needs: No Transportation Needs (4/2/2025)    PRAPARE - Transportation     Lack of Transportation (Medical): No     Lack of Transportation (Non-Medical): No   Physical Activity: Sufficiently Active (4/2/2025)    Exercise Vital Sign     Days of Exercise per Week: 7 days     Minutes of Exercise per Session: 60 min   Stress: No Stress Concern Present (4/2/2025)    [employer] [place] [employer] - Occupational Stress Questionnaire     Feeling of Stress : Only a little   Housing Stability: Low Risk  (4/2/2025)    Housing Stability Vital Sign     Unable to Pay for Housing in the Last Year: No     Number of Times Moved in the Last Year: 0     Homeless in the Last Year: No   [2]   Current Outpatient Medications on File Prior to Visit   Medication Sig Dispense Refill    BIOTIN ORAL Take 5,000 Units by mouth Daily.      estradioL (ESTRACE) 2 MG tablet Take 1 tablet (2 mg total) by mouth once daily. 90 tablet 3    progesterone (PROMETRIUM) 200 MG capsule Take 1 capsule (200 mg total) by mouth every evening. 90 capsule 3    methylPREDNISolone (MEDROL DOSEPACK) 4 mg tablet use as directed 1 each 0     No current facility-administered medications on file prior to visit.

## 2025-04-08 ENCOUNTER — PATIENT MESSAGE (OUTPATIENT)
Dept: PAIN MEDICINE | Facility: CLINIC | Age: 45
End: 2025-04-08
Payer: COMMERCIAL

## 2025-04-08 NOTE — PROGRESS NOTES
Ms. Perez will be scheduled for right L5 TF DWAINE with right L4-5 facet inj/cyst rupture on 4/16/2025- checking in at 1:30pm.  Reviewed the pre-procedure instructions listed below with the patient- copy also provided.  Instructed patient to notify clinic if she begins feeling ill, runs a fever, is prescribed antibiotics, and/or has any outpatient procedures (colonoscopy, endoscopy, OBGYN, dental work, etc.), and/or any vaccinations or booster shots within the two weeks leading up to this procedure.  Instructed patient to report to [facility], 2nd Floor Endoscopy Registration Desk.  All questions answered- patient verbalized understanding.      Day of Procedure  Ensure you have obtained an arrival time from the Pain Management Staff  You will be contacted the week before your scheduled date to review these instructions and receive your arrival time.  Please check any voicemails received.  If you arrive past your scheduled procedure time, you may be asked to reschedule your procedure.     Ensure you have a  with you.  If you arrive without a responsible adult to stay with you and drive you home, you may be asked to reschedule your procedure.      Take all of your prescribed medications that you routinely take for blood pressure, heart medications, thyroid, cholesterol, etc.  You will be informed if blood thinners should be held.    This IS a fasting procedure: Please do not eat any solid food six (6) hours before your scheduled arrival time.  You may have clear liquids up to three (3) hours before your arrival time.  Clear liquids include: water, fruit juices without pulp, clear tea, and black coffee (no sugar and/or creamer/milk)     Wear comfortable clothing (loose fitting pants).    You may wear glasses, dentures, contact lenses, and/or hearing aids.      Notify the nurse during the intake process if you are allergic to any medications, if you are diabetic, or if you are not feeling well         Diabetic Patients  Please check your blood sugar prior to coming in for your procedure.  If the value is greater than 200, contact the clinic [phone] as the procedure may need to be rescheduled.  The procedure may not be performed if your blood sugar is above 200 even after checking into the hospital.        IF you are taking blood thinners, please make sure our staff is aware so that we can obtain clearance and have you hold the medication accordingly.

## 2025-04-09 ENCOUNTER — HOSPITAL ENCOUNTER (OUTPATIENT)
Dept: RADIOLOGY | Facility: HOSPITAL | Age: 45
Discharge: HOME | End: 2025-04-09
Payer: COMMERCIAL

## 2025-04-09 DIAGNOSIS — M71.38 SYNOVIAL CYST OF LUMBAR FACET JOINT: ICD-10-CM

## 2025-04-09 DIAGNOSIS — M51.362 DEGENERATION OF INTERVERTEBRAL DISC OF LUMBAR REGION WITH DISCOGENIC BACK PAIN AND LOWER EXTREMITY PAIN: ICD-10-CM

## 2025-04-09 PROCEDURE — 72114 X-RAY EXAM L-S SPINE BENDING: CPT | Mod: TC,FY,PO

## 2025-04-09 PROCEDURE — 72114 X-RAY EXAM L-S SPINE BENDING: CPT | Mod: 26,,, | Performed by: RADIOLOGY

## 2025-04-10 ENCOUNTER — PATIENT MESSAGE (OUTPATIENT)
Dept: OBSTETRICS AND GYNECOLOGY | Facility: CLINIC | Age: 45
End: 2025-04-10
Payer: COMMERCIAL

## 2025-04-10 ENCOUNTER — OFFICE VISIT (OUTPATIENT)
Dept: NEUROSURGERY | Facility: CLINIC | Age: 45
End: 2025-04-10
Payer: COMMERCIAL

## 2025-04-10 VITALS
HEART RATE: 108 BPM | OXYGEN SATURATION: 99 % | DIASTOLIC BLOOD PRESSURE: 86 MMHG | SYSTOLIC BLOOD PRESSURE: 168 MMHG | BODY MASS INDEX: 29.28 KG/M2 | WEIGHT: 175.94 LBS

## 2025-04-10 DIAGNOSIS — M71.38 SYNOVIAL CYST OF LUMBAR FACET JOINT: ICD-10-CM

## 2025-04-10 DIAGNOSIS — M54.16 LUMBAR RADICULOPATHY: ICD-10-CM

## 2025-04-10 DIAGNOSIS — M51.362 DEGENERATION OF INTERVERTEBRAL DISC OF LUMBAR REGION WITH DISCOGENIC BACK PAIN AND LOWER EXTREMITY PAIN: ICD-10-CM

## 2025-04-10 PROCEDURE — 1159F MED LIST DOCD IN RCRD: CPT | Mod: CPTII,S$GLB,, | Performed by: PHYSICIAN ASSISTANT

## 2025-04-10 PROCEDURE — 99204 OFFICE O/P NEW MOD 45 MIN: CPT | Mod: S$GLB,,, | Performed by: PHYSICIAN ASSISTANT

## 2025-04-10 PROCEDURE — 3077F SYST BP >= 140 MM HG: CPT | Mod: CPTII,S$GLB,, | Performed by: PHYSICIAN ASSISTANT

## 2025-04-10 PROCEDURE — 3008F BODY MASS INDEX DOCD: CPT | Mod: CPTII,S$GLB,, | Performed by: PHYSICIAN ASSISTANT

## 2025-04-10 PROCEDURE — 1160F RVW MEDS BY RX/DR IN RCRD: CPT | Mod: CPTII,S$GLB,, | Performed by: PHYSICIAN ASSISTANT

## 2025-04-10 PROCEDURE — 3079F DIAST BP 80-89 MM HG: CPT | Mod: CPTII,S$GLB,, | Performed by: PHYSICIAN ASSISTANT

## 2025-04-10 NOTE — PROGRESS NOTES
[facility]  Neurosurgery    SUBJECTIVE:     History of Present Illness:  Maria Alejandra Perez is a 44 y.o. female with h/o hysterectomy who presents with a right L4-5 synovial cyst for surgical opinion.  She is scheduled for a cyst aspiration next Wednesday and wishes to know her surgical options.  She endorses pain radiating from her right hip through her posterior right thigh into her lateral right shin.  Symptoms have been present for few months.  She participated in physical therapy for 2 months but ultimately got relief for a short period of time before they returned.  She endorses pins and needles in her right foot.  She also feels like the right foot is slightly weak.  She feels like it flops down on the ground with more intensity than the left foot.        (Not in a hospital admission)      Review of patient's allergies indicates:  No Known Allergies    Past Medical History:   Diagnosis Date    Acne 10/1/2018    Resolved through oral and topical medications    Fibroids     Right arm fracture      Past Surgical History:   Procedure Laterality Date     SECTION      HYSTERECTOMY      [facility], no BSO for fibroids. Dr. Metcalf    LEFT THUMB SURGERY      right arm surgery prior fracture  1986     Family History   Problem Relation Name Age of Onset    Hypertension Father Sanya Torres     Cancer Maternal Grandmother Katarina Olivia         esophageal cancer     Cancer Maternal Grandfather      Breast cancer Neg Hx      Colon cancer Neg Hx      Ovarian cancer Neg Hx       Social History[1]     Review of Systems:  As noted in HPI    OBJECTIVE:     Vital Signs (Most Recent):  Vitals:    04/10/25 1253   BP: (!) 168/86   Pulse: 108   SpO2: 99%   Weight: 79.8 kg (175 lb 14.8 oz)   PainSc:   8   PainLoc: Leg     Body mass index is 29.28 kg/m².      Physical Exam:  General: well developed, well nourished, no distress  Head: normocephalic, atraumatic  Neurologic: Alert and oriented. Thought content  appropriate  GCS: Motor: 6/Verbal: 5/Eyes: 4 GCS Total: 15  Language: No aphasia  Speech: No dysarthria  Cranial nerves: face symmetric, tongue midline, CN II-XII grossly intact.   Eyes: pupils equal, round, reactive to light with accommodation, EOMI.   Pulmonary: normal respirations, not labored, no accessory muscles used    Sensory: intact to light touch throughout BLE    Motor Strength: Moves all extremities spontaneously with good tone.  Full strength upper and lower extremities. No abnormal movements seen.     Strength  Deltoids Triceps Biceps Hand      Upper: R 5/5 5/5 5/5 5/5      L 5/5 5/5 5/5 5/5       Iliopsoas Quadriceps  Tibialis  anterior Gastro- cnemius EHL   Lower: R 5/5 5/5  5/5 5/5 5/5    L 5/5 5/5  5/5 5/5 5/5     Choi: absent  Clonus: absent    Gait: normal      Diagnostic Results:  I have personally reviewed imaging and agree with the findings.     MRI lumbar spine, 04/06/2025:  7 mm RIGHT-sided synovial cyst/facet cyst L4-L5 as above.    X-ray lumbar spine with flexion-extension views 04/09/2025:   Grade 1 anterolisthesis of L4 on L5. No dynamic instability.       ASSESSMENT/PLAN:     Maria Alejandra Perez is a 44 y.o. female who presents with right lumbar radiculopathy 2/2 a right-sided synovial cyst at L4-5 displacing the right L5 nerve root.  She is neurologically intact on exam for me today, but she is reporting some dorsiflexion weakness noted at home when ambulating.  We discussed her imaging in great detail.  I agree with the upcoming plan of an attempted cyst rupture.  This procedure generally has a 60% success rate.  Though the cyst can recur, I do think it is worth trying the procedure before proceeding with surgical intervention.      If cyst rupture fails to improve her symptoms or if more prominent dorsiflexion weakness should develop, I would offer an MIS laminectomy for synovial cyst resection.  We discussed the possibility of a future lumbar fusion given the grade 1  anterolisthesis present at L4-5 and the risk of increased instability with the laminectomy.  Patient verbalized understanding.    Cyst rupture is scheduled next week.  I will have the patient return in 3 weeks for a repeat exam      Please feel free to call with any further questions      Ivana Cevallos PA-C  [facility]  Department of Neurosurgery  [phone]    Disclaimer: This note was dictated by speech recognition. Minor errors in transcription may be present.  Please call with any questions.         [1]   Social History  Tobacco Use    Smoking status: Former     Current packs/day: 1.00     Average packs/day: 1 pack/day for 10.0 years (10.0 ttl pk-yrs)     Types: Cigarettes    Smokeless tobacco: Never   Substance Use Topics    Alcohol use: Yes     Alcohol/week: 0.0 standard drinks of alcohol     Comment: social    Drug use: No

## 2025-04-11 NOTE — DISCHARGE INSTRUCTIONS
Home Care Instructions Pain Management:    1. DIET:   You may resume your normal diet today.   2. BATHING:   You may shower with luke warm water.  3. DRESSING:   You may remove your bandage today.   4. ACTIVITY LEVEL:   You may resume your normal activities 24 hrs after your procedure.  5. MEDICATIONS:   You may resume your normal medications today.   6. SPECIAL INSTRUCTIONS:   No heat to the injection site for 24 hrs including, bath or shower, heating pad, moist heat, or hot tubs.    Use ice pack to injection site for any pain or discomfort.  Apply ice packs for 20 minute intervals as needed.   If you have received any sedatives by mouth today you may not drive for 12 hours.    If you have received any sedation through your IV, you may not drive for 24 hrs.     PLEASE CALL YOUR DOCTOR IF:  1. Redness or swelling around the injection site.  2. Fever of 101 degrees  3. Drainage (pus) from the injection site.  4. For any continuous bleeding (some dried blood over the incision is normal.)    FOR EMERGENCIES:   If any unusual problems or difficulties occur during clinic hours, call [phone] or [phone].     Adult Procedural Sedation Instructions    Recovery After Procedural Sedation (Adult)  You have been given medicine by vein to make you sleep during your surgery. This may have included both a pain medicine and sleeping medicine. Most of the effects have worn off. But you may still have some drowsiness for the next 6 to 8 hours.  Home care  Follow these guidelines when you get home:  For the next 8 hours, you should be watched by a responsible adult. This person should make sure your condition is not getting worse.  Don't drink any alcohol for the next 24 hours.  Don't drive, operate dangerous machinery, or make important business or personal decisions during the next 24 hours.  Note: Your healthcare provider may tell you not to take any medicine by mouth for pain or sleep in the next 4 hours. These medicines may  react with the medicines you were given in the hospital. This could cause a much stronger response than usual.  Follow-up care  Follow up with your healthcare provider if you are not alert and back to your usual level of activity within 12 hours.  When to seek medical advice  Call your healthcare provider right away if any of these occur:  Drowsiness gets worse  Weakness or dizziness gets worse  Repeated vomiting  You can't be awakened   Date Last Reviewed: 10/18/2016  © 4939-2935 [employer], [employer]. [address] [place] [address], [place] [address]. All rights reserved. This information is not intended as a substitute for professional medical care. Always follow your healthcare professional's instructions.

## 2025-04-16 ENCOUNTER — HOSPITAL ENCOUNTER (OUTPATIENT)
Facility: HOSPITAL | Age: 45
Discharge: HOME | End: 2025-04-16
Attending: PAIN MEDICINE | Admitting: PAIN MEDICINE
Payer: COMMERCIAL

## 2025-04-16 VITALS
OXYGEN SATURATION: 98 % | HEART RATE: 80 BPM | RESPIRATION RATE: 18 BRPM | TEMPERATURE: 99 F | DIASTOLIC BLOOD PRESSURE: 90 MMHG | SYSTOLIC BLOOD PRESSURE: 142 MMHG

## 2025-04-16 DIAGNOSIS — M71.38 SYNOVIAL CYST OF LUMBAR FACET JOINT: Primary | ICD-10-CM

## 2025-04-16 DIAGNOSIS — M54.16 LUMBAR RADICULOPATHY: ICD-10-CM

## 2025-04-16 PROCEDURE — 64999 UNLISTED PX NERVOUS SYSTEM: CPT | Performed by: PAIN MEDICINE

## 2025-04-16 PROCEDURE — 63600175 PHARM REV CODE 636 W HCPCS: Performed by: PAIN MEDICINE

## 2025-04-16 PROCEDURE — 64999 UNLISTED PX NERVOUS SYSTEM: CPT | Mod: ,,, | Performed by: PAIN MEDICINE

## 2025-04-16 PROCEDURE — 25500020 PHARM REV CODE 255: Performed by: PAIN MEDICINE

## 2025-04-16 PROCEDURE — 64483 NJX AA&/STRD TFRM EPI L/S 1: CPT | Mod: RT,,, | Performed by: PAIN MEDICINE

## 2025-04-16 PROCEDURE — 64483 NJX AA&/STRD TFRM EPI L/S 1: CPT | Mod: RT | Performed by: PAIN MEDICINE

## 2025-04-16 RX ORDER — SODIUM CHLORIDE 9 MG/ML
INJECTION, SOLUTION INTRAVENOUS CONTINUOUS
Status: DISCONTINUED | OUTPATIENT
Start: 2025-04-16 | End: 2025-04-16 | Stop reason: HOSPADM

## 2025-04-16 RX ORDER — LIDOCAINE HYDROCHLORIDE 10 MG/ML
1 INJECTION, SOLUTION EPIDURAL; INFILTRATION; INTRACAUDAL; PERINEURAL ONCE
Status: COMPLETED | OUTPATIENT
Start: 2025-04-16 | End: 2025-04-16

## 2025-04-16 RX ORDER — LIDOCAINE HYDROCHLORIDE 10 MG/ML
20 INJECTION, SOLUTION INFILTRATION; PERINEURAL ONCE
Status: COMPLETED | OUTPATIENT
Start: 2025-04-16 | End: 2025-04-16

## 2025-04-16 RX ORDER — DEXAMETHASONE SODIUM PHOSPHATE 10 MG/ML
10 INJECTION, SOLUTION INTRA-ARTICULAR; INTRALESIONAL; INTRAMUSCULAR; INTRAVENOUS; SOFT TISSUE ONCE
Status: COMPLETED | OUTPATIENT
Start: 2025-04-16 | End: 2025-04-16

## 2025-04-16 RX ORDER — FENTANYL CITRATE 50 UG/ML
100 INJECTION, SOLUTION INTRAMUSCULAR; INTRAVENOUS
Status: DISCONTINUED | OUTPATIENT
Start: 2025-04-16 | End: 2025-04-16 | Stop reason: HOSPADM

## 2025-04-16 RX ORDER — FENTANYL CITRATE 50 UG/ML
INJECTION, SOLUTION INTRAMUSCULAR; INTRAVENOUS
Status: DISCONTINUED
Start: 2025-04-16 | End: 2025-04-16 | Stop reason: HOSPADM

## 2025-04-16 RX ORDER — MIDAZOLAM HYDROCHLORIDE 2 MG/2ML
5 INJECTION, SOLUTION INTRAMUSCULAR; INTRAVENOUS
Status: DISCONTINUED | OUTPATIENT
Start: 2025-04-16 | End: 2025-04-16 | Stop reason: HOSPADM

## 2025-04-16 RX ORDER — DEXAMETHASONE SODIUM PHOSPHATE 10 MG/ML
INJECTION, SOLUTION INTRA-ARTICULAR; INTRALESIONAL; INTRAMUSCULAR; INTRAVENOUS; SOFT TISSUE
Status: DISCONTINUED
Start: 2025-04-16 | End: 2025-04-16 | Stop reason: HOSPADM

## 2025-04-16 RX ORDER — LIDOCAINE HYDROCHLORIDE 10 MG/ML
INJECTION, SOLUTION INFILTRATION; PERINEURAL
Status: DISCONTINUED
Start: 2025-04-16 | End: 2025-04-16 | Stop reason: HOSPADM

## 2025-04-16 RX ORDER — LIDOCAINE HYDROCHLORIDE 10 MG/ML
INJECTION, SOLUTION EPIDURAL; INFILTRATION; INTRACAUDAL; PERINEURAL
Status: DISCONTINUED
Start: 2025-04-16 | End: 2025-04-16 | Stop reason: HOSPADM

## 2025-04-16 RX ORDER — MIDAZOLAM HYDROCHLORIDE 2 MG/2ML
INJECTION, SOLUTION INTRAMUSCULAR; INTRAVENOUS
Status: DISCONTINUED
Start: 2025-04-16 | End: 2025-04-16 | Stop reason: HOSPADM

## 2025-04-16 NOTE — DISCHARGE SUMMARY
[facility] - Pain Management  Discharge Note  Short Stay    Procedure(s) (LRB):  Right L5 Transforaminal Epidural Steroid Injection with right L4-5 Facet Injection/Cyst Rupture (Right)      OUTCOME: Patient tolerated treatment/procedure well without complication and is now ready for discharge.    DISPOSITION: Home or Self Care    FINAL DIAGNOSIS:  <principal problem not specified>    FOLLOWUP: In clinic    DISCHARGE INSTRUCTIONS:  No discharge procedures on file.       Discharge Diagnosis:Degeneration of intervertebral disc of lumbar region with discogenic back pain and lower extremity pain [M51.362]  Lumbar radiculopathy [M54.16]  Synovial cyst of lumbar facet joint [M71.38]  Condition on Discharge: Stable.  Diet on Discharge: Same as before.  Activity: as per instruction sheet.  Discharge to: Home with a responsible adult.  Follow up: as per Discharge instructions

## 2025-04-16 NOTE — OP NOTE
Lumbar transforaminal epidural injection  Lumbar facet joint injection with cyst rupture    Time-out taken to identify patient and procedure side prior to starting the procedure.   I attest that I have reviewed the patient's home medications prior to the procedure and no contraindication have been identified. I  re-evaluated the patient after the patient was positioned for the procedure in the procedure room immediately before the procedural time-out. The vital signs are current and represent the current state of the patient which has not significantly changed since the preprocedure assessment.                              Date of Service: 04/16/2025    PCP: Radha Dumont MD    Referring Physician:                                   PROCEDURE:   Right L5 transforaminal epidural injection under fluoroscopy  Right L4-5 facet joint injection with synovial cyst rupture    REASON FOR PROCEDURE: Lumbar Radiculopathy; lumbar spondylosis; lumbar facet synovial cyst    PHYSICIAN: Reece Galo MD    ASSISTANTS:None    MEDICATIONS INJECTED:  Preservative-free dexamethasone 10mg, Xylocaine 1% MPF 3-5ml. 3ml of the mixture per level.     LOCAL ANESTHETIC INJECTED:  Xylocaine 1% 3ml per site.    SEDATION MEDICATIONS:   Versed 2 mg and fentanyl 100 mcg IV.  Conscious sedation provided by MD and monitored by RN.  Total sedation time: 12 minutes (See nurse documentation and case log for sedation time)      ESTIMATED BLOOD LOSS:  None.    COMPLICATIONS:  None.    TECHNIQUE:   Laying in a prone position, the patient was prepped and draped in the usual sterile fashion using ChloraPrep and fenestrated drape.  The area to be injected was determined under fluoroscopic guidance.  Local anesthetic was given by raising a wheal and going down to the hub of a 27-gauge 1.25 inch needle.  The 4.75 inch 25-gauge spinal needle was introduced towards the right transverse process of L5.  The needle was walked medially then hinged into the  neural foramen.  Omnipaque was injected to confirm appropriate placement and that there was no vascular runoff.  Lidocaine 1% PF 4ml was then injected after applying negative pressure. The needle was then removed. The C-Arm ws repositioned to view the right L4-5 facet joint. Skin over the target area was anesthetized using a 27 gauge 1.25 inch needle. A 4.75 inch 25 gauge spinal needle was introduced into the right L4-5 facet joint under intermittent fluoroscopy. A scant amount of clear synovial fluid was aspirated. Omnipaque was injected to visualize filling of the facet joint and synovial cyst. Additional omnipaque was injected until cyst rupture was visualized under live fluoroscopy. Dexamethasone 10mg PF was then injected. The needle was then removed. The patient tolerated the procedure well.    PAIN BEFORE THE PROCEDURE: 7/10.    PAIN AFTER THE PROCEDURE: 2/10.    The patient was monitored after the procedure.  Patient was given post procedure and discharge instructions to follow at home.  We will see the patient back in two weeks or the patient may call to inform of status. The patient was discharged in a stable condition.

## 2025-04-16 NOTE — PLAN OF CARE
Patient tolerated procedure well. Patient reports 0/10 pain after procedure. Assisted patient up for first time. Gait steady. All discharge instructions given, stated an understanding to information provided.

## 2025-04-17 ENCOUNTER — PATIENT MESSAGE (OUTPATIENT)
Dept: PAIN MEDICINE | Facility: CLINIC | Age: 45
End: 2025-04-17
Payer: COMMERCIAL

## 2025-04-17 DIAGNOSIS — M47.816 LUMBAR SPONDYLOSIS: Primary | ICD-10-CM

## 2025-05-08 ENCOUNTER — CLINICAL SUPPORT (OUTPATIENT)
Dept: REHABILITATION | Facility: HOSPITAL | Age: 45
End: 2025-05-08
Attending: PAIN MEDICINE
Payer: COMMERCIAL

## 2025-05-08 DIAGNOSIS — M47.816 LUMBAR SPONDYLOSIS: ICD-10-CM

## 2025-05-08 PROCEDURE — 97161 PT EVAL LOW COMPLEX 20 MIN: CPT | Mod: PN

## 2025-05-08 PROCEDURE — 97110 THERAPEUTIC EXERCISES: CPT | Mod: PN

## 2025-05-08 NOTE — PROGRESS NOTES
Outpatient Rehab    Physical Therapy Evaluation    Patient Name: Maria Alejandra Perez  MRN: [id number]  YOB: 1980  Encounter Date: 5/8/2025    Therapy Diagnosis:   Encounter Diagnosis   Name Primary?    Lumbar spondylosis      Physician: Reece Galo Jr*    Physician Orders: Eval and Treat  Medical Diagnosis: Lumbar spondylosis    Visit # / Visits Authorized:  1 / 1  Insurance Authorization Period: 4/17/2025 to 12/31/2025  Date of Evaluation: 5/8/2025  Plan of Care Certification: 5/8/2025 to 7/3/2025     Time In: 1415   Time Out: 1500  Total Time (in minutes): 45   Total Billable Time (in minutes): 45    Intake Outcome Measure for FOTO Survey    Therapist reviewed FOTO scores for Maria Alejandra Perez on 5/8/2025.   FOTO report - see Media section or FOTO account episode details.     Intake Score: 76%         Subjective   History of Present Illness  Maria Alejandra is a 45 y.o. female who reports to physical therapy with a chief concern of Low back pain.     The patient reports a medical diagnosis of M47.816 (ICD-10-CM) - Lumbar spondylosis.    Diagnostic tests related to this condition: X-ray.   X-Ray Details: Grade 1 anterolisthesis of L4 on L5.  No dynamic instability.  No spondylolysis.  Vertebral body heights are well maintained.  Osseous mineralization is preserved.  No suspicious lytic or blastic lesions.  Intervertebral disc heights are well maintained.  Degenerative facet arthropathy at L4-L5.  SI joints are symmetric.  Sacrum is unremarkable.  Rounded calcification projects over the anterior abdomen on the lateral views    History of Present Condition/Illness: Patient is a 45 year old female who presents to PT with low back pain. Pain began on October 2024 with a gradual onset after the patient was dancing. Patient was fine while dancing but reported that her pain increased the morning after. Patient had an epidural injection by Dr. Galo on 4/16/2025 which abolished her RLE pain and ruptured a  synovial cyst.    Activities of Daily Living  Social history was obtained from Patient.               Previously independent with activities of daily living? Yes     Currently independent with activities of daily living? Yes          Previously independent with instrumental activities of daily living? Yes     Currently independent with instrumental activities of daily living? Yes              Pain     Patient reports a current pain level of 0/10. Pain at best is reported as 0/10. Pain at worst is reported as 3/10.   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      Past Medical History/Physical Systems Review:   Maria Alejandra Perez  has a past medical history of Acne, Fibroids, and Right arm fracture.    Maria Alejandra Perez  has a past surgical history that includes  section; Hysterectomy (); right arm surgery prior fracture (); LEFT THUMB SURGERY; and injection, spine, lumbosacral, transforaminal approach (Right, 2025).    Maria Alejandra has a current medication list which includes the following prescription(s): biotin, estradiol, gabapentin, methylprednisolone, and progesterone.    Review of patient's allergies indicates:  No Known Allergies     Objective   Posture          Pelvic tilt observed: Posterior              Lower Extremity Sensation  Right Lumbar/Lower Extremity Sensation  Intact: Light Touch       Left  Lumbar/Lower Extremity Sensation  Intact: Light Touch                Right Lower Extremity Reflexes  Patellar, L4: Normal (2+)         Achilles, S1: Normal (2+)         Left Lower Extremity Reflexes  Patellar, L4: Normal (2+)          Achilles, S1: Normal (2+)             Spinal Mobility  Hypermobile: Lumbosacral       Spinal Muscle Palpation  Right Spinal Muscle Palpation  Abnormal: Lumbar/Sacral  Right Lumbar/Sacral Muscle Palpation Observations: TTP paraspinals       Left Spinal Muscle Palpation  Abnormal: Lumbar/Sacral  Left Lumbar/Sacral Muscle Palpation Observations: TTP paraspinals       Hip Palpation  Right Hip Palpation  Abnormal: Lumbar/Sacral Muscle  Right Lumbar/Sacral Muscle Palpation Observations: TTP paraspinals       Left Hip Palpation  Abnormal: Lumbar/Sacral Muscle  Left Lumbar/Sacral Muscle Palpation Observations: TTP paraspinals            Lumbar Range of Motion   Active (deg) Passive (deg) Pain   Flexion 100 (Hinge point at L2-L3)   Yes   Extension 100       Right Lateral Flexion 75   Yes   Right Rotation 100       Left Lateral Flexion 75       Left Rotation 100                Hip Range of Motion   Right Hip   Active (deg) Passive (deg) Pain   Flexion 120       Extension         ABduction         ADduction         External Rotation 90/90 50       External Rotation Prone         Internal Rotation 90/90 35       Internal Rotation Prone             Left Hip   Active (deg) Passive (deg) Pain   Flexion         Extension         ABduction         ADduction         External Rotation 90/90 50       External Rotation Prone         Internal Rotation 90/90 40       Internal Rotation Prone                      Lumbar Strength   Strength Pain   Flexion       Extension       Right Lateral Flexion       Left Lateral Flexion       Right Rotation       Left Rotation       Transverse Abdominus Strength Testing: Fair abdominal draw, difficuclty holding while breathing            Hip Strength - Planes of Motion    "Right Strength Right Pain Left Strength Left  Pain   Flexion (L2) 5   5     Extension 4   4     ABduction 5   5     ADduction           Internal Rotation 4+   4+     External Rotation 4+   4+         Knee Strength   Right Strength Right Pain Left Strength Left  Pain   Flexion (S2) 4+   5     Prone Flexion           Extension (L3) 5   5            Ankle/Foot Strength - Planes of Motion   Right Strength Right Pain Left Strength Left  Pain   Dorsiflexion (L4) 4+   5     Plantar Flexion (S1) 5   5     Inversion           Eversion           Great Toe Flexion           Great Toe Extension (L5)           Lesser Toes Flexion           Lesser Toes Extension                  Lumbar/Pelvic Girdle Special Tests  Lumbar Tests - Repeated  Negative: Flexion and Extension       Akash's Flexor Endurance (sec): 40  Akash's Extensor Endurance (sec): 24  Right Akash's Plank Endurance (sec): 48  Left Akash's Plank Endurance (sec): 62              Pelvic Girdle / Sacrum Tests  Positive: Right JEANNE  Negative: Left JEANNE, Right FADIR, and Left FADIR         Hip Special Tests  Intra-Articular/Impingement Tests  Positive: Right JEANNE  Negative: Left JEANNE, Right FADIR, and Left FADIR           Transfers Assessment  Sit to Stand Assistance: Independent  Chair to Bed Assistance: Independent  Bed to Chair Assistance: Independent      Ambulation Assistance Required  Surface With  Assistive Device Without Assistive Device Details   Level   Independent      Uneven   Independent     Curb                Treatment:  Therapeutic Exercise  TE 1: Glute bridge w/ abdominal draw 3x10  TE 2: Dead bugs x10  TE 3: Hip hinge 3x10  TE 4: DKTC 3x30" holds      Time Entry(in minutes):  PT Evaluation (Low) Time Entry: 30  Therapeutic Exercise Time Entry: 15    Assessment & Plan   Assessment  Maria Alejandra presents with a condition of Low complexity.   Presentation of Symptoms: Stable       Functional Limitations: Activity tolerance, Carrying objects, Completing " work/school activities, Functional mobility, Participating in leisure activities  Impairments: Abnormal muscle firing, Impaired physical strength  Personal Factors Affecting Prognosis: Pain    Patient Goal for Therapy (PT): Patient wants to be able to manage and decrease her back pain.  Prognosis: Good  Assessment Details: Patient is a 45 year old female who presents to PT with signs and symptoms consistent with referring diagnosis. They present with lumbar and hip range of motion that is WNL, decreased abdominal and paraspinal strength, poor abdominal motor control, hypermobile lumbar PA glides and increased pain with endrange lumbar flexion and prolonged positioning. They are functionally limited in bending and lifting tasks. Self rated disability is considered mild with a Tawnya intake score of 4/100. The evaluation is considered low complexity secondary to co-morbid conditions and stable nature of the identified medical condition. They were educated on plan of care and consented to treatment. No precautions or contraindications to therapy were identified. They will benefit from skilled PT intervention to address functional deficits identified and return to prior level of function.     Plan  From a physical therapy perspective, the patient would benefit from: Skilled Rehab Services    Planned therapy interventions include: Therapeutic exercise, Therapeutic activities, Manual therapy, and Neuromuscular re-education.    Planned modalities to include: Biofeedback, Cryotherapy (cold pack), Electrical stimulation - attended, Electrical stimulation - passive/unattended, and Thermotherapy (hot pack).        Visit Frequency: 2 times Per Week for 8 Weeks.       This plan was discussed with Patient.   Discussion participants: Agreed Upon Plan of Care             Patient's spiritual, cultural, and educational needs considered and patient agreeable to plan of care and goals.     Education  Education was done with Patient. The  patient's learning style includes Demonstration and Listening. The patient Demonstrates understanding and Verbalizes understanding.         Patient educated on POC, goals, HEP, and consents to treatment        Goals:   Active       1. Short term goals        Patient will demonstrate 5/5 lower quarter strength to safely perform functional activty       Start:  05/08/25    Expected End:  06/05/25            Patient will be able to improve abdominal draw strength to good, in order to properly brace herself when performing ADLs.           Start:  05/08/25    Expected End:  06/05/25            Patient will demonstrate 0/10 pain squatting in order to pick objects from the floor.        Start:  05/08/25    Expected End:  06/05/25               2. Long term goals        Patient will improve FOTO score to 82 or greater indicating clinically significant improvement in function       Start:  05/08/25    Expected End:  07/03/25            Patient to be able to exercise with 0/10 pain            Start:  05/08/25    Expected End:  07/03/25             Patient will improve Akash endurance tests to WNL in order to properly brace his core during work activities.         Start:  05/08/25    Expected End:  07/03/25                Trip Perry, PT

## 2025-05-14 ENCOUNTER — OFFICE VISIT (OUTPATIENT)
Dept: PAIN MEDICINE | Facility: CLINIC | Age: 45
End: 2025-05-14
Payer: COMMERCIAL

## 2025-05-14 VITALS — SYSTOLIC BLOOD PRESSURE: 131 MMHG | HEART RATE: 107 BPM | OXYGEN SATURATION: 96 % | DIASTOLIC BLOOD PRESSURE: 80 MMHG

## 2025-05-14 DIAGNOSIS — M51.362 DEGENERATION OF INTERVERTEBRAL DISC OF LUMBAR REGION WITH DISCOGENIC BACK PAIN AND LOWER EXTREMITY PAIN: ICD-10-CM

## 2025-05-14 DIAGNOSIS — M54.16 LUMBAR RADICULOPATHY: ICD-10-CM

## 2025-05-14 DIAGNOSIS — M47.816 LUMBAR SPONDYLOSIS: ICD-10-CM

## 2025-05-14 DIAGNOSIS — M71.38 SYNOVIAL CYST OF LUMBAR FACET JOINT: Primary | ICD-10-CM

## 2025-05-14 PROCEDURE — 1160F RVW MEDS BY RX/DR IN RCRD: CPT | Mod: CPTII,S$GLB,,

## 2025-05-14 PROCEDURE — 1159F MED LIST DOCD IN RCRD: CPT | Mod: CPTII,S$GLB,,

## 2025-05-14 PROCEDURE — 99999 PR PBB SHADOW E&M-EST. PATIENT-LVL III: CPT | Mod: PBBFAC,,,

## 2025-05-14 PROCEDURE — 3079F DIAST BP 80-89 MM HG: CPT | Mod: CPTII,S$GLB,,

## 2025-05-14 PROCEDURE — 99213 OFFICE O/P EST LOW 20 MIN: CPT | Mod: S$GLB,,,

## 2025-05-14 PROCEDURE — 3075F SYST BP GE 130 - 139MM HG: CPT | Mod: CPTII,S$GLB,,

## 2025-05-14 NOTE — PROGRESS NOTES
Subjective:     Patient ID: Maria Alejandra Perez is a 45 y.o. female    Chief Complaint: Follow-up      Referred by: No ref. provider found      HPI:    Interval History PA (05/14/2025):  Patient presents for follow-up after a cyst rupture procedure, reporting significant improvement and feeling 90% better. She still has mild back pain but has been working on core stabilization exercises to support her back. During the procedure, she experienced the cyst rupture, describing pressure from fluid injection followed by a sudden release of pressure. Initially apprehensive, she found it better than anticipated. The first epidural injection was well-tolerated, but she felt the injection into the cyst was slightly uncomfortable.    She had an evaluation with Bhupendra Perry on Thursday and experienced increased soreness after, which caused some concern. She expresses anxiety about feeling worse after therapy, stating she had been feeling good but experienced discomfort after therapy. However, she reports feeling fine today.    Interval History PA (04/07/2025):  Patient returns to clinic for follow up of right-sided lower back and extremity pain and MRI review.  Patient denies significant changes in the quality or location of her pain since last encounter.  Locates pain over right lower lumbar/lumbosacral region radiating into right lower extremity all the way down to her foot with associated paresthesia.  She does note some difficulty walking due to pain.  Denies focal weakness although states when walking on her right foot she does have difficulty controlling specific muscles.  Denies bowel or bladder dysfunction.    Initial Encounter (4/1/25):  Maria Alejandra Perez is a 45 y.o. female who presents today with right-sided low back and lower extremity pain.  This pain has been present for 4-5 months.  No specific inciting events or injuries noted.  Pain is located in the right lower lumbar/lumbosacral region radiate to the right lower  extremity all the way to her foot with associated paresthesias.  Patient is also noticing some difficulty walking on her heel on the right.  She denies any associated bowel bladder dysfunction.  Pain is constant.  Somewhat improved with activity.  Worsened with initiating activity after rest.   This pain is described in detail below.    Physical Therapy:  Yes.    Non-pharmacologic Treatment:  Rest helps         TENS?  No    Pain Medications:         Currently taking:  Nothing    Has tried in the past:  NSAIDs, Tylenol    Has not tried:  Opioids, Muscle relaxants, TCAs, SNRIs, anticonvulsants, topical creams    Blood thinners:  None    Interventional Therapies:    04/16/2025 - right L5 TF DWAINE with right L4-5 facet joint injection/cyst rupture - 90% relief    Relevant Surgeries:  None    Affecting sleep?  Yes    Affecting daily activities? yes    Depressive symptoms? No          SI/HI? No    Work status: Employed    Pain Scores:    Best:       0/10  Worst:     3/10  Usually:   1/10  Today:    0/10    Pain Disability Index  Family/Home Responsibilities:: 0  Recreation:: 0  Social Activity:: 0  Occupation:: 0  Sexual Behavior:: 3  Self Care:: 0  Life-Support Activities:: 0  Pain Disability Index (PDI): 3    Review of Systems   Constitutional:  Negative for activity change, appetite change, chills, fatigue, fever and unexpected weight change.   HENT:  Negative for hearing loss.    Eyes:  Negative for visual disturbance.   Respiratory:  Negative for chest tightness and shortness of breath.    Cardiovascular:  Negative for chest pain.   Gastrointestinal:  Negative for abdominal pain, constipation, diarrhea, nausea and vomiting.   Genitourinary:  Negative for difficulty urinating.   Musculoskeletal:  Positive for back pain, gait problem and myalgias. Negative for neck pain.   Skin:  Negative for rash.   Neurological:  Positive for weakness and numbness. Negative for dizziness, light-headedness and headaches.    Psychiatric/Behavioral:  Positive for sleep disturbance. Negative for hallucinations and suicidal ideas. The patient is not nervous/anxious.        Past Medical History:   Diagnosis Date    Acne 10/1/2018    Resolved through oral and topical medications    Fibroids     Right arm fracture        Past Surgical History:   Procedure Laterality Date     SECTION      HYSTERECTOMY      DVH, no BSO for fibroids. Dr. Metcalf    INJECTION, SPINE, LUMBOSACRAL, TRANSFORAMINAL APPROACH Right 2025    Procedure: Right L5 Transforaminal Epidural Steroid Injection with right L4-5 Facet Injection/Cyst Rupture;  Surgeon: Reece Galo Jr., MD;  Location: [facility];  Service: Pain Management;  Laterality: Right;  @1330(given)  No ATC or DM  MD Sign.    LEFT THUMB SURGERY      right arm surgery prior fracture         Social History[1]    Review of patient's allergies indicates:  No Known Allergies    Medications Ordered Prior to Encounter[2]    Objective:      /80 (BP Location: Left arm, Patient Position: Sitting)   Pulse 107   SpO2 96%     Exam:  GEN:  Well developed, well nourished.  No acute distress.  Normal pain behavior.  HEENT:  No trauma.  Mucous membranes moist.  Nares patent bilaterally.  PSYCH: Normal affect. Thought content appropriate.  CHEST:  Breathing symmetric.  No audible wheezing.  ABD: Soft, non-distended.  SKIN:  Warm, pink, dry.  No rash on exposed areas.    EXT:  No cyanosis, clubbing, or edema.  No color change or changes in nail or hair growth.  NEURO/MUSCULOSKELETAL:  Fully alert, oriented, and appropriate. Speech normal celia. No cranial nerve deficits.   Gait:  Normal.  No trendelenburg sign bilaterally.     Imaging:  Narrative & Impression  EXAMINATION:  MRI LUMBAR SPINE WITHOUT CONTRAST     CLINICAL HISTORY:  Lumbar radiculopathy, symptoms persist with conservative treatment; Radiculopathy, lumbar region     TECHNIQUE:  Multiplanar, multisequence MR images  were acquired from the thoracolumbar junction to the sacrum without the administration of contrast.     FINDINGS:  Alignment: Normal.     Vertebrae: 5 lumbar-type vertebral bodies. No aggressive marrow replacement process or fracture.Modic Changes: Type 2 (fat endplate signal) Level: L5-S1     Discs: Desiccation of disc material L5-S1 with annular fissure     Cord: Normal. Conus terminates at L1.     Degenerative findings:     T12-L1: There is no focal disc herniation. No significant central canal narrowing . No significant neural foraminal narrowing.     L1-L2: There is no focal disc herniation. No significant central canal narrowing . No significant neural foraminal narrowing.     L2-L3: There is no focal disc herniation. No significant central canal narrowing . No significant neural foraminal narrowing.     L3-L4: There is no focal disc herniation. No significant central canal narrowing . No significant neural foraminal narrowing.     L4-L5: There is no focal disc herniation.7 mm synovial cyst RIGHT sided facet (axial image 35 series 13 sagittal series 5, image 7 with encroachment upon the RIGHT lateral recess and suspected mass-effect upon the RIGHT L5 nerve root.  Low volume facet effusions.  No significant neural foraminal narrowing.     L5-S1: There is no focal disc herniation. No significant central canal narrowing . No significant neural foraminal narrowing.     Paraspinal muscles & soft tissues: Unremarkable.     Impression:     7 mm RIGHT-sided synovial cyst/facet cyst L4-L5 as above.        Electronically signed by:Kaz Diaz MD  Date:                                            04/06/2025  Time:                                           11:24    Assessment:       Encounter Diagnoses   Name Primary?    Synovial cyst of lumbar facet joint Yes    Degeneration of intervertebral disc of lumbar region with discogenic back pain and lower extremity pain     Lumbar radiculopathy     Lumbar spondylosis             Plan:       Maria Alejandra was seen today for follow-up.    Diagnoses and all orders for this visit:    Synovial cyst of lumbar facet joint    Degeneration of intervertebral disc of lumbar region with discogenic back pain and lower extremity pain    Lumbar radiculopathy    Lumbar spondylosis          Maria Alejandra Perez is a 45 y.o. female with chronic right-sided low back and lower extremity pain.  Associated numbness and weakness.  Concerning for right L5 radiculopathy.  Lumbar MRI showing a large 7 mm synovial cyst from the right L4-5 facet joint.  Cyst is causing compression of the right L5 nerve root.  Significant benefit with right L5 TF DWAINE with right L4-5 facet joint injections//rupture.    Pertinent imaging studies reviewed by me. Imaging results were discussed with patient.  Patient is s/p right L5 transforaminal epidural steroid injection with a right L4-5 facet injections with attempted cyst rupture reporting 90% relief.  Patient notes improvement ADLs.  Consider repeating in the future if needed.  Proceed with physical therapy for lumbar ROM, strengthening, stretching home exercise program.  Stressed the importance of maintaining regular home exercise program and being mindful of how they use their back throughout the day.  Patient expressed understanding and agreement.   Return to clinic as needed.      This note was created by combination of typed  and M-Modal dictation. Transcription and phonetic errors may be present.  If there are any questions, please contact me.    This note was generated with the assistance of ambient listening technology. Verbal consent was obtained by the patient and accompanying visitor(s) for the recording of patient appointment to facilitate this note. I attest to having reviewed and edited the generated note for accuracy, though some syntax or spelling errors may persist. Please contact the author of this note for any clarification.           [1]   Social  History  Socioeconomic History    Marital status:    Tobacco Use    Smoking status: Former     Current packs/day: 1.00     Average packs/day: 1 pack/day for 10.0 years (10.0 ttl pk-yrs)     Types: Cigarettes    Smokeless tobacco: Never   Substance and Sexual Activity    Alcohol use: Yes     Alcohol/week: 0.0 standard drinks of alcohol     Comment: social    Drug use: No    Sexual activity: Yes     Partners: Male     Birth control/protection: See Surgical Hx     Comment:       Social Drivers of Health     Financial Resource Strain: Medium Risk (4/2/2025)    Overall Financial Resource Strain (CARDIA)     Difficulty of Paying Living Expenses: Somewhat hard   Food Insecurity: No Food Insecurity (4/2/2025)    Hunger Vital Sign     Worried About Running Out of Food in the Last Year: Never true     Ran Out of Food in the Last Year: Never true   Transportation Needs: No Transportation Needs (4/2/2025)    PRAPARE - Transportation     Lack of Transportation (Medical): No     Lack of Transportation (Non-Medical): No   Physical Activity: Sufficiently Active (4/2/2025)    Exercise Vital Sign     Days of Exercise per Week: 7 days     Minutes of Exercise per Session: 60 min   Stress: No Stress Concern Present (4/2/2025)    [employer] - Occupational Stress Questionnaire     Feeling of Stress : Only a little   Housing Stability: Low Risk  (4/2/2025)    Housing Stability Vital Sign     Unable to Pay for Housing in the Last Year: No     Number of Times Moved in the Last Year: 0     Homeless in the Last Year: No   [2]   Current Outpatient Medications on File Prior to Visit   Medication Sig Dispense Refill    BIOTIN ORAL Take 5,000 Units by mouth Daily.      estradioL (ESTRACE) 2 MG tablet Take 1 tablet (2 mg total) by mouth once daily. 90 tablet 3    progesterone (PROMETRIUM) 200 MG capsule Take 1 capsule (200 mg total) by mouth every evening. 90 capsule 3    gabapentin (NEURONTIN) 300 MG  capsule Take 2 capsules (600 mg total) by mouth 3 (three) times daily. 180 capsule 5    methylPREDNISolone (MEDROL DOSEPACK) 4 mg tablet use as directed 1 each 0     No current facility-administered medications on file prior to visit.

## 2025-05-20 ENCOUNTER — CLINICAL SUPPORT (OUTPATIENT)
Dept: REHABILITATION | Facility: HOSPITAL | Age: 45
End: 2025-05-20
Payer: COMMERCIAL

## 2025-05-20 DIAGNOSIS — M53.86 DECREASED ROM OF LUMBAR SPINE: ICD-10-CM

## 2025-05-20 DIAGNOSIS — R29.898 DECREASED STRENGTH OF LOWER EXTREMITY: Primary | ICD-10-CM

## 2025-05-20 PROCEDURE — 97110 THERAPEUTIC EXERCISES: CPT | Mod: PN

## 2025-05-20 PROCEDURE — 97530 THERAPEUTIC ACTIVITIES: CPT | Mod: PN

## 2025-05-20 NOTE — PROGRESS NOTES
"  Outpatient Rehab    Physical Therapy Visit    Patient Name: Maria Alejandra Perez  MRN: [id number]  YOB: 1980  Encounter Date: 5/20/2025    Therapy Diagnosis:   Encounter Diagnoses   Name Primary?    Decreased strength of lower extremity Yes    Decreased ROM of lumbar spine      Physician: Reece Galo Jr*    Physician Orders: Eval and Treat  Medical Diagnosis: Lumbar spondylosis    Visit # / Visits Authorized:  1 / 20  Insurance Authorization Period: 5/8/2025 to 12/31/2025  Date of Evaluation: 5/8/2025  Plan of Care Certification: 5/8/2025 to 7/3/2025      PT/PTA:     Number of PTA visits since last PT visit:   Time In: 1100   Time Out: 1200  Total Time (in minutes): 60   Total Billable Time (in minutes): 60    FOTO:  Intake Score:  %  Survey Score 2:  %  Survey Score 3:  %    Precautions:       Subjective   Patient reports no pain or discomfort today..  Pain reported as 0/10.      Objective            Treatment:  Therapeutic Exercise  TE 1: Treadmill 10'  TE 2: Heel drive bridges 3x10  TE 3: Side planks 5x30"  Balance/Neuromuscular Re-Education  NMR 1: Supine marches with abdominal draw (BP cuff for feedback) 3x10  NMR 2: OH press with U/L pink sport cord stability L&R 3x10 ea  NMR 3: GTB paloff press 3x10  NMR 4: Kick back stance RDL 10#KB 3x10    Time Entry(in minutes):  Therapeutic Activity Time Entry: 45  Therapeutic Exercise Time Entry: 15    Assessment & Plan   Assessment: Maria Alejandra presents to PT for her inital follow up after her evaluation reproting a positive response to her HEP. Interventions today were chosen to strengthen her core to provide proper spinal stability while performing funcitonal activty. BP cuff used as feedback to maintian neutral spine with her supine marches. VCs provided to maintain hip extension with side planks. She still presents with core and hip strength but is porgressing towards her goals.  Evaluation/Treatment Tolerance: Patient tolerated treatment well    Patient " will continue to benefit from skilled outpatient physical therapy to address the deficits listed in the problem list box on initial evaluation, provide pt/family education and to maximize pt's level of independence in the home and community environment.     Patient's spiritual, cultural, and educational needs considered and patient agreeable to plan of care and goals.           Plan: Progress per POC    Goals:   Active       1. Short term goals        Patient will demonstrate 5/5 lower quarter strength to safely perform functional activty       Start:  05/08/25    Expected End:  06/05/25            Patient will be able to improve abdominal draw strength to good, in order to properly brace herself when performing ADLs.           Start:  05/08/25    Expected End:  06/05/25            Patient will demonstrate 0/10 pain squatting in order to pick objects from the floor.        Start:  05/08/25    Expected End:  06/05/25               2. Long term goals        Patient will improve FOTO score to 82 or greater indicating clinically significant improvement in function       Start:  05/08/25    Expected End:  07/03/25            Patient to be able to exercise with 0/10 pain            Start:  05/08/25    Expected End:  07/03/25             Patient will improve Akash endurance tests to WNL in order to properly brace his core during work activities.         Start:  05/08/25    Expected End:  07/03/25                Trip Perry, PT

## 2025-05-22 ENCOUNTER — CLINICAL SUPPORT (OUTPATIENT)
Dept: REHABILITATION | Facility: HOSPITAL | Age: 45
End: 2025-05-22
Payer: COMMERCIAL

## 2025-05-22 DIAGNOSIS — M53.86 DECREASED ROM OF LUMBAR SPINE: ICD-10-CM

## 2025-05-22 DIAGNOSIS — R29.898 DECREASED STRENGTH OF LOWER EXTREMITY: Primary | ICD-10-CM

## 2025-05-22 PROCEDURE — 97110 THERAPEUTIC EXERCISES: CPT | Mod: PN

## 2025-05-22 PROCEDURE — 97112 NEUROMUSCULAR REEDUCATION: CPT | Mod: PN

## 2025-05-22 NOTE — PROGRESS NOTES
"  Outpatient Rehab    Physical Therapy Visit    Patient Name: Maria Alejandra Perez  MRN: [id number]  YOB: 1980  Encounter Date: 5/22/2025    Therapy Diagnosis:   Encounter Diagnoses   Name Primary?    Decreased strength of lower extremity Yes    Decreased ROM of lumbar spine      Physician: Reece Galo Jr*    Physician Orders: Eval and Treat  Medical Diagnosis: Lumbar spondylosis    Visit # / Visits Authorized:  2 / 20  Insurance Authorization Period: 5/8/2025 to 12/31/2025  Date of Evaluation: 5/8/2025  Plan of Care Certification: 5/8/2025 to 7/3/2025      PT/PTA:     Number of PTA visits since last PT visit:   Time In: 1100   Time Out: 1204  Total Time (in minutes): 64   Total Billable Time (in minutes): 60    FOTO:  Intake Score:  %  Survey Score 2:  %  Survey Score 3:  %    Precautions:       Subjective   Patient reports no pain today. Was a little sore following the previous session but currently has no complaints..  Pain reported as 0/10.      Objective            Treatment:  Therapeutic Exercise  TE 1: Treadmill 10' 2.6 mph  TE 2: Bosu bridges 3x10  TE 3: Side planks 5x30"  TE 4: Lat/monster walks GTB 3 laps  Balance/Neuromuscular Re-Education  NMR 1: Supine marches with abdominal draw (BP cuff for feedback) 3x10  NMR 2: OH press with U/L pink sport cord stability L&R 3x10 ea  NMR 3: STS 18" box, tidal tank on shoulders 3x10  NMR 4: 10 KB subwar carries 3 laps    Time Entry(in minutes):  Neuromuscular Re-Education Time Entry: 30  Therapeutic Exercise Time Entry: 30    Assessment & Plan   Assessment: Session focused on progressed hip/core strength for spinal stability with functional activities. Cues were provided to maintain a direct lateral path with lateral walks and to brace her core with STS. Understanding of cues were noted via return demonstration and optimal body mechaincs with all activty. Tidal tank used to provide pertubations with the STS. They still present with deficits in hip and " core strength but is making steady progress towards their goals. They will benefit from continuing skilled PT to address their impairments and decrease their functional limitations.  Evaluation/Treatment Tolerance: Patient tolerated treatment well    The patient will continue to benefit from skilled outpatient physical therapy in order to address the deficits listed in the problem list on the initial evaluation, provide patient and family education, and maximize the patients level of independence in the home and community environments.     The patient's spiritual, cultural, and educational needs were considered, and the patient is agreeable to the plan of care and goals.           Plan: Progress per POC    Goals:   Active       1. Short term goals        Patient will demonstrate 5/5 lower quarter strength to safely perform functional activty       Start:  05/08/25    Expected End:  06/05/25            Patient will be able to improve abdominal draw strength to good, in order to properly brace herself when performing ADLs.           Start:  05/08/25    Expected End:  06/05/25            Patient will demonstrate 0/10 pain squatting in order to pick objects from the floor.        Start:  05/08/25    Expected End:  06/05/25               2. Long term goals        Patient will improve FOTO score to 82 or greater indicating clinically significant improvement in function       Start:  05/08/25    Expected End:  07/03/25            Patient to be able to exercise with 0/10 pain            Start:  05/08/25    Expected End:  07/03/25             Patient will improve Akash endurance tests to WNL in order to properly brace his core during work activities.         Start:  05/08/25    Expected End:  07/03/25                Trip Perry, PT

## 2025-05-27 ENCOUNTER — CLINICAL SUPPORT (OUTPATIENT)
Dept: REHABILITATION | Facility: HOSPITAL | Age: 45
End: 2025-05-27
Payer: COMMERCIAL

## 2025-05-27 DIAGNOSIS — R29.898 DECREASED STRENGTH OF LOWER EXTREMITY: Primary | ICD-10-CM

## 2025-05-27 DIAGNOSIS — M53.86 DECREASED ROM OF LUMBAR SPINE: ICD-10-CM

## 2025-05-27 PROCEDURE — 97112 NEUROMUSCULAR REEDUCATION: CPT | Mod: PN

## 2025-05-27 PROCEDURE — 97110 THERAPEUTIC EXERCISES: CPT | Mod: PN

## 2025-05-27 NOTE — PROGRESS NOTES
Outpatient Rehab    Physical Therapy Visit    Patient Name: Maria Alejandra Perez  MRN: [id number]  YOB: 1980  Encounter Date: 5/27/2025    Therapy Diagnosis:   Encounter Diagnoses   Name Primary?    Decreased strength of lower extremity Yes    Decreased ROM of lumbar spine      Physician: Reece Galo Jr*    Physician Orders: Eval and Treat  Medical Diagnosis: Lumbar spondylosis    Visit # / Visits Authorized:  3 / 20  Insurance Authorization Period: 5/8/2025 to 12/31/2025  Date of Evaluation: 5/8/2025  Plan of Care Certification: 5/8/2025 to 7/3/2025      PT/PTA:     Number of PTA visits since last PT visit:   Time In: 1006   Time Out: 1104  Total Time (in minutes): 58   Total Billable Time (in minutes): 55    FOTO:  Intake Score:  %  Survey Score 2:  %  Survey Score 3:  %    Precautions:       Subjective   Patient  presents to PT with no new complaints. Noted that her core exericses are beginning to get easier..  Pain reported as 0/10.      Objective            Treatment:  Therapeutic Exercise  TE 1: Upright bike 10'  TE 2: Bosu bridges 3x10  TE 3: Side planks w/ RTB clamshells 5x10  TE 4: 20# hex bar deadlift 3x10  Balance/Neuromuscular Re-Education  NMR 1: Supine marches with abdominal draw (BP cuff for feedback) 3x10  NMR 2: OH press w/ 5# KB and sport cord hang L&R 3x10  NMR 3: Lunge twists with tidal tank 2x10 ea  NMR 4: Lat carries with cord and 5# KB 3 laps    Time Entry(in minutes):  Neuromuscular Re-Education Time Entry: 30  Therapeutic Exercise Time Entry: 25    Assessment & Plan   Assessment: Session focused on core strength and spinal stabilization for safe perofrmance of functinal activites. Cues were provided to maintain a neutral spine with dead lifts and for shoulder width  with OH presses. Understanding of cues were noted via return demonstration and optimal body mechaincs with all activty. They still present with deficits in core strength/spinal stability with dynamic  movement but is making steady progress towards their goals. They will benefit from continuing skilled PT to address their impairments and decrease their functional limitations.  Evaluation/Treatment Tolerance: Patient tolerated treatment well    The patient will continue to benefit from skilled outpatient physical therapy in order to address the deficits listed in the problem list on the initial evaluation, provide patient and family education, and maximize the patients level of independence in the home and community environments.     The patient's spiritual, cultural, and educational needs were considered, and the patient is agreeable to the plan of care and goals.           Plan: Progress per POC    Goals:   Active       1. Short term goals        Patient will demonstrate 5/5 lower quarter strength to safely perform functional activty       Start:  05/08/25    Expected End:  06/05/25            Patient will be able to improve abdominal draw strength to good, in order to properly brace herself when performing ADLs.           Start:  05/08/25    Expected End:  06/05/25            Patient will demonstrate 0/10 pain squatting in order to pick objects from the floor.        Start:  05/08/25    Expected End:  06/05/25               2. Long term goals        Patient will improve FOTO score to 82 or greater indicating clinically significant improvement in function       Start:  05/08/25    Expected End:  07/03/25            Patient to be able to exercise with 0/10 pain            Start:  05/08/25    Expected End:  07/03/25             Patient will improve Akash endurance tests to WNL in order to properly brace his core during work activities.         Start:  05/08/25    Expected End:  07/03/25                Trip Perry, PT

## 2025-05-30 DIAGNOSIS — E28.319 PREMATURE MENOPAUSE: ICD-10-CM

## 2025-05-30 RX ORDER — PROGESTERONE 200 MG/1
200 CAPSULE ORAL NIGHTLY
Qty: 90 CAPSULE | Refills: 2 | Status: SHIPPED | OUTPATIENT
Start: 2025-05-30

## 2025-05-30 NOTE — TELEPHONE ENCOUNTER
Refill Decision Note   Maria Alejandra Perez  is requesting a refill authorization.  Brief Assessment and Rationale for Refill:  Approve     Medication Therapy Plan:         Comments:     Note composed:2:02 PM 05/30/2025

## 2025-06-24 ENCOUNTER — OFFICE VISIT (OUTPATIENT)
Dept: FAMILY MEDICINE | Facility: CLINIC | Age: 45
End: 2025-06-24
Payer: COMMERCIAL

## 2025-06-24 VITALS
OXYGEN SATURATION: 100 % | BODY MASS INDEX: 29.97 KG/M2 | HEIGHT: 65 IN | WEIGHT: 179.88 LBS | HEART RATE: 81 BPM | DIASTOLIC BLOOD PRESSURE: 70 MMHG | SYSTOLIC BLOOD PRESSURE: 156 MMHG

## 2025-06-24 DIAGNOSIS — Z12.12 ENCOUNTER FOR COLORECTAL CANCER SCREENING: ICD-10-CM

## 2025-06-24 DIAGNOSIS — R03.0 WHITE COAT SYNDROME WITHOUT DIAGNOSIS OF HYPERTENSION: ICD-10-CM

## 2025-06-24 DIAGNOSIS — Z00.00 ANNUAL PHYSICAL EXAM: Primary | ICD-10-CM

## 2025-06-24 DIAGNOSIS — N95.1 MENOPAUSAL SYMPTOMS: ICD-10-CM

## 2025-06-24 DIAGNOSIS — Z12.11 ENCOUNTER FOR COLORECTAL CANCER SCREENING: ICD-10-CM

## 2025-06-24 PROCEDURE — 3078F DIAST BP <80 MM HG: CPT | Mod: CPTII,S$GLB,, | Performed by: FAMILY MEDICINE

## 2025-06-24 PROCEDURE — 3008F BODY MASS INDEX DOCD: CPT | Mod: CPTII,S$GLB,, | Performed by: FAMILY MEDICINE

## 2025-06-24 PROCEDURE — 99396 PREV VISIT EST AGE 40-64: CPT | Mod: S$GLB,,, | Performed by: FAMILY MEDICINE

## 2025-06-24 PROCEDURE — 1160F RVW MEDS BY RX/DR IN RCRD: CPT | Mod: CPTII,S$GLB,, | Performed by: FAMILY MEDICINE

## 2025-06-24 PROCEDURE — 1159F MED LIST DOCD IN RCRD: CPT | Mod: CPTII,S$GLB,, | Performed by: FAMILY MEDICINE

## 2025-06-24 PROCEDURE — 3077F SYST BP >= 140 MM HG: CPT | Mod: CPTII,S$GLB,, | Performed by: FAMILY MEDICINE

## 2025-06-24 PROCEDURE — 99999 PR PBB SHADOW E&M-EST. PATIENT-LVL III: CPT | Mod: PBBFAC,,, | Performed by: FAMILY MEDICINE

## 2025-06-24 RX ORDER — TESTOSTERONE GEL, 1% 10 MG/G
GEL TRANSDERMAL DAILY
COMMUNITY

## 2025-06-25 NOTE — PROGRESS NOTES
Routine Office Visit     Patient Name: Maria Alejandra Perez    : 1980  MRN: [id number]    Subjective     History of Present Illness    CHIEF COMPLAINT:  Patient presents today for annual physical exam     HYPERTENSION:  She experiences white coat hypertension with elevated blood pressure during office visits, which causes her anxiety. Her home blood pressure readings are typically within normal range, consistently measuring in the 120s/70s range. She has a home blood pressure monitoring device and is willing to recheck and report her home measurements to the clinic.    BACK PAIN:  She has a history of back pain secondary to a synovial cyst causing sciatic pain radiating through hip and leg. In April, she underwent a steroid injection procedure where the cyst was ruptured. Since the procedure, she notes significant improvement in symptoms. She currently experiences occasional mild pain, markedly less intense than prior to the intervention.    HORMONE ISSUES:  She has ongoing hormone issues since early adulthood with a history of prolonged menstrual bleeding starting at age 13 requiring extensive testing. She has had perimenopause symptoms since age 36, characterized by severe hot flashes that previously caused significant sweating and social discomfort. She currently reports fewer hot flashes, primarily occurring at night. Last year's labs confirmed significantly low estrogen levels. She reports low libido associated with low testosterone. She expresses significant concern about hormone deficiency, particularly regarding potential impacts on bone and brain health. She denies improvement with previous treatment approaches like fluoxetine for hot flashes. She reports difficulty obtaining appropriate hormone management due to healthcare providers' concerns about potential side effects such as blood clots and cancer.    SURGICAL HISTORY:  She has a history of hysterectomy performed many years ago. She has undergone right  "arm surgery and thumb surgery.    FAMILY HISTORY:  She has a family history of cancer and high blood pressure. Her father takes blood pressure medication and additional unspecified medications.    MEDICATIONS:  She currently takes multivitamin, Vitamin D 400 international units, magnesium, estradiol 0.1 mg, testosterone, and progesterone for hormone replacement therapy.      ROS:  General: no fever, no chills, no fatigue, no weight gain, no weight loss  Eyes: no vision changes, no redness, no discharge  ENT: no ear pain, no nasal congestion, no sore throat  Cardiovascular: no chest pain, no palpitations, no lower extremity edema  Respiratory: no cough, no shortness of breath  Gastrointestinal: no abdominal pain, no nausea, no vomiting, no diarrhea, no constipation, no blood in stool  Genitourinary: no dysuria, no hematuria, no frequency  Musculoskeletal: no joint pain, no muscle pain, +back pain  Skin: no rash, no lesion, +hot flashes, +excessive sweating  Neurological: no headache, no dizziness, no numbness, no tingling  Psychiatric: no anxiety, no depression, no sleep difficulty  Female Genitourinary: +changed libido           Objective     BP (!) 156/70 (BP Location: Left arm, Patient Position: Sitting)   Pulse 81   Ht 5' 5" (1.651 m)   Wt 81.6 kg (179 lb 14.3 oz)   SpO2 100%   BMI 29.94 kg/m²   Physical Exam  Constitutional:       Appearance: She is well-developed.   HENT:      Head: Normocephalic and atraumatic.   Eyes:      Conjunctiva/sclera: Conjunctivae normal.      Pupils: Pupils are equal, round, and reactive to light.   Cardiovascular:      Rate and Rhythm: Normal rate and regular rhythm.      Heart sounds: Normal heart sounds. No murmur heard.     No friction rub. No gallop.   Pulmonary:      Effort: No respiratory distress.      Breath sounds: Normal breath sounds.   Abdominal:      General: Bowel sounds are normal. There is no distension.      Palpations: Abdomen is soft.      Tenderness: There " is no abdominal tenderness.   Musculoskeletal:         General: Normal range of motion.      Cervical back: Normal range of motion and neck supple.   Lymphadenopathy:      Cervical: No cervical adenopathy.   Skin:     General: Skin is warm.   Neurological:      Mental Status: She is alert and oriented to person, place, and time.           Assessment     Assessment & Plan      LUMBAGO WITH SCIATICA:   Patient reports sciatic pain through hip and leg due to a synovial cyst in the back.   In April, patient underwent a procedure where a steroid injection was used to rupture the cyst, resulting in significant improvement of symptoms.    FOLLOW-UP AND GENERAL RECOMMENDATIONS:   Patient to continue regular exercise regimen, maintain adequate hydration, use sunblock consistently, and be mindful of dietary choices that may affect hormone levels naturally.   Ordered routine annual labs.   Patient to come in for lab work on Saturday at 8:00 AM.   Follow up after lab results are available to review findings.         Problem List Items Addressed This Visit    None  Visit Diagnoses         Annual physical exam    -  Primary    Relevant Orders    CBC Auto Differential    Comprehensive Metabolic Panel    Lipid Panel    Hemoglobin A1C    TSH      Menopausal symptoms        Relevant Orders    DXA Bone Density Axial Skeleton 1 or more sites   Assessed patient's hormone replacement therapy (HRT) regimen, noting she has been experiencing perimenopausal symptoms since age 36, including hot flashes and low libido.   Estrogen levels were very low last year but are now in normal range after HRT initiated by gynecologist.   Decided against checking current hormone levels to avoid potential dosage reduction if levels appear high, as patient still experiences some symptoms.   Discussed natural course of hormone levels decreasing with age and potential benefits of HRT, including cardiac health benefits.   Continued estradiol (at maximum dose),  testosterone, and progesterone at current doses.   Ordered DEXA scan for assessing bone health.   Advised patient to be mindful of dietary choices that may affect hormone levels naturally.        Encounter for colorectal cancer screening        Relevant Orders    Cologuard Screening (Multitarget Stool DNA)      White coat syndrome without diagnosis of hypertension       Patient reports anxiety about blood pressure readings, especially in clinical settings.  Recommend recheck bp at home                 This note was generated with the assistance of ambient listening technology. Verbal consent was obtained by the patient and accompanying visitor(s) for the recording of patient appointment to facilitate this note. I attest to having reviewed and edited the generated note for accuracy, though some syntax or spelling errors may persist. Please contact the author of this note for any clarification.

## 2025-07-02 ENCOUNTER — PATIENT MESSAGE (OUTPATIENT)
Dept: PAIN MEDICINE | Facility: CLINIC | Age: 45
End: 2025-07-02
Payer: COMMERCIAL

## 2025-07-03 DIAGNOSIS — M54.16 LUMBAR RADICULOPATHY, CHRONIC: Primary | ICD-10-CM

## 2025-07-03 DIAGNOSIS — M71.38 SYNOVIAL CYST OF LUMBAR FACET JOINT: ICD-10-CM

## 2025-07-08 ENCOUNTER — PATIENT MESSAGE (OUTPATIENT)
Dept: ADMINISTRATIVE | Facility: HOSPITAL | Age: 45
End: 2025-07-08
Payer: COMMERCIAL

## 2025-07-08 ENCOUNTER — TELEPHONE (OUTPATIENT)
Dept: PAIN MEDICINE | Facility: CLINIC | Age: 45
End: 2025-07-08
Payer: COMMERCIAL

## 2025-07-08 NOTE — TELEPHONE ENCOUNTER
Attempted to return call to Dr. Jackman's office to inform them that Dr. Galo has not referred this pt to him- no answer or option to [facility].

## 2025-07-08 NOTE — TELEPHONE ENCOUNTER
Copied from CRM #[id number]. Topic: Appointments - Amb Referral  >> Jul 8, 2025  2:43 PM Aurelia wrote:  [facility]/Dr. Desmond Jackman calling to advise referral that was received have no demographics, insurance on patient . [facility] call [phone]

## 2025-07-09 ENCOUNTER — PATIENT OUTREACH (OUTPATIENT)
Dept: ADMINISTRATIVE | Facility: HOSPITAL | Age: 45
End: 2025-07-09
Payer: COMMERCIAL

## 2025-07-10 ENCOUNTER — HOSPITAL ENCOUNTER (OUTPATIENT)
Dept: RADIOLOGY | Facility: HOSPITAL | Age: 45
Discharge: HOME | End: 2025-07-10
Attending: PAIN MEDICINE
Payer: COMMERCIAL

## 2025-07-10 DIAGNOSIS — M54.16 LUMBAR RADICULOPATHY, CHRONIC: ICD-10-CM

## 2025-07-10 DIAGNOSIS — M71.38 SYNOVIAL CYST OF LUMBAR FACET JOINT: ICD-10-CM

## 2025-07-10 PROCEDURE — 72148 MRI LUMBAR SPINE W/O DYE: CPT | Mod: TC

## 2025-07-10 PROCEDURE — 72148 MRI LUMBAR SPINE W/O DYE: CPT | Mod: 26,,, | Performed by: RADIOLOGY

## 2025-07-14 ENCOUNTER — PATIENT MESSAGE (OUTPATIENT)
Dept: NEUROSURGERY | Facility: CLINIC | Age: 45
End: 2025-07-14
Payer: COMMERCIAL

## 2025-07-14 ENCOUNTER — LAB VISIT (OUTPATIENT)
Dept: LAB | Facility: HOSPITAL | Age: 45
End: 2025-07-14
Attending: FAMILY MEDICINE
Payer: COMMERCIAL

## 2025-07-14 ENCOUNTER — OFFICE VISIT (OUTPATIENT)
Dept: PAIN MEDICINE | Facility: CLINIC | Age: 45
End: 2025-07-14
Payer: COMMERCIAL

## 2025-07-14 VITALS
SYSTOLIC BLOOD PRESSURE: 163 MMHG | OXYGEN SATURATION: 98 % | HEART RATE: 112 BPM | DIASTOLIC BLOOD PRESSURE: 84 MMHG | RESPIRATION RATE: 18 BRPM

## 2025-07-14 DIAGNOSIS — M71.38 SYNOVIAL CYST OF LUMBAR FACET JOINT: ICD-10-CM

## 2025-07-14 DIAGNOSIS — M54.16 LUMBAR RADICULOPATHY: ICD-10-CM

## 2025-07-14 DIAGNOSIS — M47.816 LUMBAR SPONDYLOSIS: Primary | ICD-10-CM

## 2025-07-14 DIAGNOSIS — Z00.00 ANNUAL PHYSICAL EXAM: ICD-10-CM

## 2025-07-14 LAB
ABSOLUTE EOSINOPHIL (OHS): 0.12 K/UL
ABSOLUTE MONOCYTE (OHS): 0.75 K/UL (ref 0.3–1)
ABSOLUTE NEUTROPHIL COUNT (OHS): 6.79 K/UL (ref 1.8–7.7)
ALBUMIN SERPL BCP-MCNC: 4.5 G/DL (ref 3.5–5.2)
ALP SERPL-CCNC: 108 UNIT/L (ref 40–150)
ALT SERPL W/O P-5'-P-CCNC: 39 UNIT/L (ref 10–44)
ANION GAP (OHS): 7 MMOL/L (ref 8–16)
AST SERPL-CCNC: 28 UNIT/L (ref 11–45)
BASOPHILS # BLD AUTO: 0.04 K/UL
BASOPHILS NFR BLD AUTO: 0.4 %
BILIRUB SERPL-MCNC: 0.4 MG/DL (ref 0.1–1)
BUN SERPL-MCNC: 19 MG/DL (ref 6–20)
CALCIUM SERPL-MCNC: 9.4 MG/DL (ref 8.7–10.5)
CHLORIDE SERPL-SCNC: 106 MMOL/L (ref 95–110)
CHOLEST SERPL-MCNC: 189 MG/DL (ref 120–199)
CHOLEST/HDLC SERPL: 3.2 {RATIO} (ref 2–5)
CO2 SERPL-SCNC: 28 MMOL/L (ref 23–29)
CREAT SERPL-MCNC: 0.8 MG/DL (ref 0.5–1.4)
EAG (OHS): 94 MG/DL (ref 68–131)
ERYTHROCYTE [DISTWIDTH] IN BLOOD BY AUTOMATED COUNT: 12.1 % (ref 11.5–14.5)
GFR SERPLBLD CREATININE-BSD FMLA CKD-EPI: >60 ML/MIN/1.73/M2
GLUCOSE SERPL-MCNC: 95 MG/DL (ref 70–110)
HBA1C MFR BLD: 4.9 % (ref 4–5.6)
HCT VFR BLD AUTO: 44.9 % (ref 37–48.5)
HDLC SERPL-MCNC: 60 MG/DL (ref 40–75)
HDLC SERPL: 31.7 % (ref 20–50)
HGB BLD-MCNC: 14.4 GM/DL (ref 12–16)
IMM GRANULOCYTES # BLD AUTO: 0.05 K/UL (ref 0–0.04)
IMM GRANULOCYTES NFR BLD AUTO: 0.5 % (ref 0–0.5)
LDLC SERPL CALC-MCNC: 113.4 MG/DL (ref 63–159)
LYMPHOCYTES # BLD AUTO: 2.28 K/UL (ref 1–4.8)
MCH RBC QN AUTO: 31 PG (ref 27–31)
MCHC RBC AUTO-ENTMCNC: 32.1 G/DL (ref 32–36)
MCV RBC AUTO: 97 FL (ref 82–98)
NONHDLC SERPL-MCNC: 129 MG/DL
NUCLEATED RBC (/100WBC) (OHS): 0 /100 WBC
PLATELET # BLD AUTO: 214 K/UL (ref 150–450)
PMV BLD AUTO: 10.5 FL (ref 9.2–12.9)
POTASSIUM SERPL-SCNC: 4.6 MMOL/L (ref 3.5–5.1)
PROT SERPL-MCNC: 7.1 GM/DL (ref 6–8.4)
RBC # BLD AUTO: 4.65 M/UL (ref 4–5.4)
RELATIVE EOSINOPHIL (OHS): 1.2 %
RELATIVE LYMPHOCYTE (OHS): 22.7 % (ref 18–48)
RELATIVE MONOCYTE (OHS): 7.5 % (ref 4–15)
RELATIVE NEUTROPHIL (OHS): 67.7 % (ref 38–73)
SODIUM SERPL-SCNC: 141 MMOL/L (ref 136–145)
TRIGL SERPL-MCNC: 78 MG/DL (ref 30–150)
TSH SERPL-ACNC: 1.56 UIU/ML (ref 0.4–4)
WBC # BLD AUTO: 10.03 K/UL (ref 3.9–12.7)

## 2025-07-14 PROCEDURE — 1159F MED LIST DOCD IN RCRD: CPT | Mod: CPTII,S$GLB,, | Performed by: PAIN MEDICINE

## 2025-07-14 PROCEDURE — 80053 COMPREHEN METABOLIC PANEL: CPT

## 2025-07-14 PROCEDURE — 99999 PR PBB SHADOW E&M-EST. PATIENT-LVL III: CPT | Mod: PBBFAC,,, | Performed by: PAIN MEDICINE

## 2025-07-14 PROCEDURE — 36415 COLL VENOUS BLD VENIPUNCTURE: CPT | Mod: PO

## 2025-07-14 PROCEDURE — 3079F DIAST BP 80-89 MM HG: CPT | Mod: CPTII,S$GLB,, | Performed by: PAIN MEDICINE

## 2025-07-14 PROCEDURE — 80061 LIPID PANEL: CPT

## 2025-07-14 PROCEDURE — 1160F RVW MEDS BY RX/DR IN RCRD: CPT | Mod: CPTII,S$GLB,, | Performed by: PAIN MEDICINE

## 2025-07-14 PROCEDURE — 85025 COMPLETE CBC W/AUTO DIFF WBC: CPT

## 2025-07-14 PROCEDURE — 3077F SYST BP >= 140 MM HG: CPT | Mod: CPTII,S$GLB,, | Performed by: PAIN MEDICINE

## 2025-07-14 PROCEDURE — 84443 ASSAY THYROID STIM HORMONE: CPT

## 2025-07-14 PROCEDURE — 99214 OFFICE O/P EST MOD 30 MIN: CPT | Mod: S$GLB,,, | Performed by: PAIN MEDICINE

## 2025-07-14 PROCEDURE — 83036 HEMOGLOBIN GLYCOSYLATED A1C: CPT

## 2025-07-14 RX ORDER — TIZANIDINE 4 MG/1
8 TABLET ORAL NIGHTLY
Qty: 60 TABLET | Refills: 5 | Status: SHIPPED | OUTPATIENT
Start: 2025-07-14 | End: 2026-01-10

## 2025-07-14 NOTE — PROGRESS NOTES
Subjective:     Patient ID: Maria Alejandra Perez is a 45 y.o. female    Chief Complaint: Follow-up (MRI Review 7/10)      Referred by: No ref. provider found      HPI:    Interval History (7/14/25):  She returns today for follow up and MRI review.  She reports that right-sided low back and lower extremity pain have returned.  She got 1 month of very good relief following right L4-5 facet joint cyst rupture.  After this time, her pain returned to baseline.  She denies any changes in the quality or location of the pain.  She denies worsening symptoms.  She has been evaluated by surgery and was offered surgical correction, but she is seeking multiple opinions to make sure that she has a full understanding of her options.  She does request that a medication be prescribed to help her sleep when needed.  She does not want to take the medication every day.        Interval History PA (05/14/2025):  Patient presents for follow-up after a cyst rupture procedure, reporting significant improvement and feeling 90% better. She still has mild back pain but has been working on core stabilization exercises to support her back. During the procedure, she experienced the cyst rupture, describing pressure from fluid injection followed by a sudden release of pressure. Initially apprehensive, she found it better than anticipated. The first epidural injection was well-tolerated, but she felt the injection into the cyst was slightly uncomfortable.    She had an evaluation with Bhupendra Perry on Thursday and experienced increased soreness after, which caused some concern. She expresses anxiety about feeling worse after therapy, stating she had been feeling good but experienced discomfort after therapy. However, she reports feeling fine today.    Interval History PA (04/07/2025):  Patient returns to clinic for follow up of right-sided lower back and extremity pain and MRI review.  Patient denies significant changes in the quality or location of her  pain since last encounter.  Locates pain over right lower lumbar/lumbosacral region radiating into right lower extremity all the way down to her foot with associated paresthesia.  She does note some difficulty walking due to pain.  Denies focal weakness although states when walking on her right foot she does have difficulty controlling specific muscles.  Denies bowel or bladder dysfunction.    Initial Encounter (4/1/25):  Maria Alejandra Perez is a 45 y.o. female who presents today with right-sided low back and lower extremity pain.  This pain has been present for 4-5 months.  No specific inciting events or injuries noted.  Pain is located in the right lower lumbar/lumbosacral region radiate to the right lower extremity all the way to her foot with associated paresthesias.  Patient is also noticing some difficulty walking on her heel on the right.  She denies any associated bowel bladder dysfunction.  Pain is constant.  Somewhat improved with activity.  Worsened with initiating activity after rest.   This pain is described in detail below.    Physical Therapy:  Yes.    Non-pharmacologic Treatment:  Rest helps         TENS?  No    Pain Medications:         Currently taking:  Nothing    Has tried in the past:  NSAIDs, Tylenol    Has not tried:  Opioids, Muscle relaxants, TCAs, SNRIs, anticonvulsants, topical creams    Blood thinners:  None    Interventional Therapies:    04/16/2025 - right L5 TF DWAINE with right L4-5 facet joint injection/cyst rupture - 90% relief for 1 month    Relevant Surgeries:  None    Affecting sleep?  Yes    Affecting daily activities? yes    Depressive symptoms? No          SI/HI? No    Work status: Employed    Pain Scores:    Best:       4/10  Worst:     9/10  Usually:   6/10  Today:    8/10    Pain Disability Index  Family/Home Responsibilities:: 6  Recreation:: 7  Social Activity:: 5  Occupation:: 3  Sexual Behavior:: 9  Self Care:: 8  Life-Support Activities:: 8  Pain Disability Index (PDI):  46    Review of Systems   Constitutional:  Negative for activity change, appetite change, chills, fatigue, fever and unexpected weight change.   HENT:  Negative for hearing loss.    Eyes:  Negative for visual disturbance.   Respiratory:  Negative for chest tightness and shortness of breath.    Cardiovascular:  Negative for chest pain.   Gastrointestinal:  Negative for abdominal pain, constipation, diarrhea, nausea and vomiting.   Genitourinary:  Negative for difficulty urinating.   Musculoskeletal:  Positive for back pain, gait problem and myalgias. Negative for neck pain.   Skin:  Negative for rash.   Neurological:  Positive for weakness and numbness. Negative for dizziness, light-headedness and headaches.   Psychiatric/Behavioral:  Positive for sleep disturbance. Negative for hallucinations and suicidal ideas. The patient is not nervous/anxious.        Past Medical History:   Diagnosis Date    Acne 10/1/2018    Resolved through oral and topical medications    Fibroids     Right arm fracture        Past Surgical History:   Procedure Laterality Date     SECTION      HYSTERECTOMY      DV, no BSO for fibroids. Dr. Metcalf    INJECTION, SPINE, LUMBOSACRAL, TRANSFORAMINAL APPROACH Right 2025    Procedure: Right L5 Transforaminal Epidural Steroid Injection with right L4-5 Facet Injection/Cyst Rupture;  Surgeon: Reece Galo Jr., MD;  Location: [facility]IN [facility];  Service: Pain Management;  Laterality: Right;  @1330(given)  No ATC or DM  MD Sign.    LEFT THUMB SURGERY      right arm surgery prior fracture         Social History[1]    Review of patient's allergies indicates:  No Known Allergies    Medications Ordered Prior to Encounter[2]    Objective:      BP (!) 163/84 (BP Location: Right arm, Patient Position: Sitting)   Pulse (!) 112   Resp 18   SpO2 98%     Exam:  GEN:  Well developed, well nourished.  No acute distress.  Normal pain behavior.  HEENT:  No trauma.  Mucous membranes  moist.  Nares patent bilaterally.  PSYCH: Normal affect. Thought content appropriate.  CHEST:  Breathing symmetric.  No audible wheezing.  ABD: Soft, non-distended.  SKIN:  Warm, pink, dry.  No rash on exposed areas.    EXT:  No cyanosis, clubbing, or edema.  No color change or changes in nail or hair growth.  NEURO/MUSCULOSKELETAL:  Fully alert, oriented, and appropriate. Speech normal celia. No cranial nerve deficits.   Gait:  Normal.  No trendelenburg sign bilaterally.     Imaging:    Narrative & Impression    EXAMINATION:  MRI LUMBAR SPINE WITHOUT CONTRAST     CLINICAL HISTORY:  Lumbar radiculopathy, symptoms persist with conservative treatment; Radiculopathy, lumbar region     TECHNIQUE:  Multiplanar, multisequence MR images were acquired from the thoracolumbar junction to the sacrum without the administration of contrast.     COMPARISON:  04/09/2025     FINDINGS:  Alignment: Normal.     Vertebrae: 5 lumbar-type vertebral bodies. No aggressive marrow replacement process or fracture.Modic Changes: Type 1/admixed with type 2 level: L5-S1.  Mild stress related edema at the level the pedicles bilaterally.     Discs: Desiccation of disc material L5-S1     Cord: Normal. Conus terminates at L1.     Degenerative findings:     T12-L1: There is no focal disc herniation. No significant central canal narrowing . No significant neural foraminal narrowing.     L1-L2: There is no focal disc herniation. No significant central canal narrowing . No significant neural foraminal narrowing.     L2-L3: There is no focal disc herniation. No significant central canal narrowing . No significant neural foraminal narrowing.     L3-L4: There is no focal disc herniation. No significant central canal narrowing . No significant neural foraminal narrowing.     L4-L5: Broad-based disc bulge, facet degenerative change and ligamentum flavum thickening which contribute to  mild central canal narrowing .  There is a cyst at the level the RIGHT  facet measuring 9.8 x 8.8 x 12.2 mm consistent with synovial cyst/facet cyst.  This has mass effect upon the thecal sac.  Less likely consideration would be that of perineural root sleeve cyst which CT myelogram could exclude if indicated.  This cystic structure has mass effect on the RIGHT L5 nerve root.  No significant neural foraminal narrowing.     L5-S1: Mild broad-based bulging of disc material.  Facet arthropathy.  No significant central canal narrowing . Mild bilateral neural foraminal narrowing.     Paraspinal muscles & soft tissues: Unremarkable.     Impression:     Approximately 1 cm RIGHT-sided synovial cyst adjacent to facet L4-L5 with mass effect upon the RIGHT L5 nerve root and thecal sac margins.     Mild degenerative changes L5-S1 inclusive of Modic degenerative change as above with bilateral neural foraminal encroachment, mild.        Electronically signed by:Kaz Diaz MD  Date:                                            07/12/2025  Time:                                           07:05           Assessment:       Encounter Diagnoses   Name Primary?    Lumbar spondylosis Yes    Lumbar radiculopathy     Synovial cyst of lumbar facet joint              Plan:       Maria Alejandra was seen today for follow-up.    Diagnoses and all orders for this visit:    Lumbar spondylosis  -     tiZANidine (ZANAFLEX) 4 MG tablet; Take 2 tablets (8 mg total) by mouth every evening.    Lumbar radiculopathy  -     tiZANidine (ZANAFLEX) 4 MG tablet; Take 2 tablets (8 mg total) by mouth every evening.    Synovial cyst of lumbar facet joint  -     tiZANidine (ZANAFLEX) 4 MG tablet; Take 2 tablets (8 mg total) by mouth every evening.            Maria Alejandra Perez is a 45 y.o. female with chronic right-sided low back and lower extremity pain.  Associated numbness and weakness.  Concerning for right L5 radiculopathy.  Lumbar MRI showing a large 7 mm synovial cyst from the right L4-5 facet joint.  Cyst is causing compression of the  right L5 nerve root.  Significant benefit with right L5 TF DWAINE with right L4-5 facet joint injections//rupture, but only lasted for 1 month.  Current symptoms..    Pertinent imaging studies reviewed by me. Imaging results were discussed with patient.  Give it limited duration of relief following right L4-5 synovial cyst rupture, it would be best to pursue more definitive treatment the surgical options.  Patient expressed understanding and agreement.  Tizanidine 4-8 mg q.h.s..  Stressed the importance of maintaining regular home exercise program and being mindful of how they use their back throughout the day.  Patient expressed understanding and agreement.   Return to clinic as needed.      This note was created by combination of typed  and M-Modal dictation. Transcription and phonetic errors may be present.  If there are any questions, please contact me.    This note was generated with the assistance of ambient listening technology. Verbal consent was obtained by the patient and accompanying visitor(s) for the recording of patient appointment to facilitate this note. I attest to having reviewed and edited the generated note for accuracy, though some syntax or spelling errors may persist. Please contact the author of this note for any clarification.             [1]   Social History  Socioeconomic History    Marital status:    Tobacco Use    Smoking status: Former     Current packs/day: 1.00     Average packs/day: 1 pack/day for 10.0 years (10.0 ttl pk-yrs)     Types: Cigarettes    Smokeless tobacco: Never   Substance and Sexual Activity    Alcohol use: Yes     Alcohol/week: 0.0 standard drinks of alcohol     Comment: social    Drug use: No    Sexual activity: Yes     Partners: Male     Birth control/protection: See Surgical Hx     Comment:       Social Drivers of Health     Financial Resource Strain: Medium Risk (4/2/2025)    Overall Financial Resource Strain (CARDIA)     Difficulty of Paying  Living Expenses: Somewhat hard   Food Insecurity: No Food Insecurity (4/2/2025)    Hunger Vital Sign     Worried About Running Out of Food in the Last Year: Never true     Ran Out of Food in the Last Year: Never true   Transportation Needs: No Transportation Needs (4/2/2025)    PRAPARE - Transportation     Lack of Transportation (Medical): No     Lack of Transportation (Non-Medical): No   Physical Activity: Sufficiently Active (4/2/2025)    Exercise Vital Sign     Days of Exercise per Week: 7 days     Minutes of Exercise per Session: 60 min   Stress: No Stress Concern Present (4/2/2025)    [employer] - Occupational Stress Questionnaire     Feeling of Stress : Only a little   Housing Stability: Low Risk  (4/2/2025)    Housing Stability Vital Sign     Unable to Pay for Housing in the Last Year: No     Number of Times Moved in the Last Year: 0     Homeless in the Last Year: No   [2]   Current Outpatient Medications on File Prior to Visit   Medication Sig Dispense Refill    BIOTIN ORAL Take 5,000 Units by mouth Daily.      estradioL (ESTRACE) 2 MG tablet Take 1 tablet (2 mg total) by mouth once daily. 90 tablet 3    progesterone (PROMETRIUM) 200 MG capsule Take 1 capsule (200 mg total) by mouth every evening. 90 capsule 2    testosterone (ANDROGEL) 1 % (50 mg/5 gram) GlPk Apply topically once daily. Pt taking 2%       No current facility-administered medications on file prior to visit.

## 2025-07-17 ENCOUNTER — HOSPITAL ENCOUNTER (OUTPATIENT)
Dept: RADIOLOGY | Facility: CLINIC | Age: 45
Discharge: HOME | End: 2025-07-17
Attending: FAMILY MEDICINE
Payer: COMMERCIAL

## 2025-07-17 DIAGNOSIS — N95.1 MENOPAUSAL SYMPTOMS: ICD-10-CM

## 2025-07-17 PROCEDURE — 77080 DXA BONE DENSITY AXIAL: CPT | Mod: TC,PO

## 2025-07-22 ENCOUNTER — OFFICE VISIT (OUTPATIENT)
Dept: NEUROSURGERY | Facility: CLINIC | Age: 45
End: 2025-07-22
Payer: COMMERCIAL

## 2025-07-22 ENCOUNTER — PATIENT MESSAGE (OUTPATIENT)
Dept: ADMINISTRATIVE | Facility: HOSPITAL | Age: 45
End: 2025-07-22
Payer: COMMERCIAL

## 2025-07-22 ENCOUNTER — PATIENT OUTREACH (OUTPATIENT)
Dept: ADMINISTRATIVE | Facility: HOSPITAL | Age: 45
End: 2025-07-22
Payer: COMMERCIAL

## 2025-07-22 ENCOUNTER — TELEPHONE (OUTPATIENT)
Dept: ADMINISTRATIVE | Facility: HOSPITAL | Age: 45
End: 2025-07-22
Payer: COMMERCIAL

## 2025-07-22 VITALS
DIASTOLIC BLOOD PRESSURE: 81 MMHG | BODY MASS INDEX: 30.27 KG/M2 | WEIGHT: 181.88 LBS | HEART RATE: 115 BPM | OXYGEN SATURATION: 100 % | SYSTOLIC BLOOD PRESSURE: 177 MMHG

## 2025-07-22 VITALS — DIASTOLIC BLOOD PRESSURE: 78 MMHG | SYSTOLIC BLOOD PRESSURE: 127 MMHG

## 2025-07-22 DIAGNOSIS — M71.38 SYNOVIAL CYST OF LUMBAR FACET JOINT: Primary | ICD-10-CM

## 2025-07-22 DIAGNOSIS — M54.16 LUMBAR RADICULOPATHY: ICD-10-CM

## 2025-07-22 PROCEDURE — 3044F HG A1C LEVEL LT 7.0%: CPT | Mod: CPTII,S$GLB,, | Performed by: PHYSICIAN ASSISTANT

## 2025-07-22 PROCEDURE — 99214 OFFICE O/P EST MOD 30 MIN: CPT | Mod: S$GLB,,, | Performed by: PHYSICIAN ASSISTANT

## 2025-07-22 PROCEDURE — 3077F SYST BP >= 140 MM HG: CPT | Mod: CPTII,S$GLB,, | Performed by: PHYSICIAN ASSISTANT

## 2025-07-22 PROCEDURE — 1160F RVW MEDS BY RX/DR IN RCRD: CPT | Mod: CPTII,S$GLB,, | Performed by: PHYSICIAN ASSISTANT

## 2025-07-22 PROCEDURE — 3079F DIAST BP 80-89 MM HG: CPT | Mod: CPTII,S$GLB,, | Performed by: PHYSICIAN ASSISTANT

## 2025-07-22 PROCEDURE — 3008F BODY MASS INDEX DOCD: CPT | Mod: CPTII,S$GLB,, | Performed by: PHYSICIAN ASSISTANT

## 2025-07-22 PROCEDURE — 1159F MED LIST DOCD IN RCRD: CPT | Mod: CPTII,S$GLB,, | Performed by: PHYSICIAN ASSISTANT

## 2025-07-22 NOTE — PROGRESS NOTES
[facility]  Neurosurgery    SUBJECTIVE:     Interval history 2025:   Patient returns to clinic for follow up of a right L4-5 synovial cyst.  She did undergo a cyst rupture in mid April which provided full relief of her symptoms until late May.  Unfortunately, repeat MRIs shows that the cyst has returned and is 1 mm larger than it was prior to the rupture.  She did follow up with Dr. Galo who recommended surgical resection over repeat rupture.  She is here today for a surgical opinion.    Pain is located in her right low back and right hip with additional pain in the right lateral shin.  She reports some tingling sensations in her lower leg that varies with positioning of her back.  No overt numbness or weakness      History of Present Illness 04/10/2025:  Maria Alejandra Perez is a 45 y.o. female with h/o hysterectomy who presents with a right L4-5 synovial cyst for surgical opinion.  She is scheduled for a cyst aspiration next Wednesday and wishes to know her surgical options.  She endorses pain radiating from her right hip through her posterior right thigh into her lateral right shin.  Symptoms have been present for few months.  She participated in physical therapy for 2 months but ultimately got relief for a short period of time before they returned.  She endorses pins and needles in her right foot.  She also feels like the right foot is slightly weak.  She feels like it flops down on the ground with more intensity than the left foot.        (Not in a hospital admission)      Review of patient's allergies indicates:  No Known Allergies    Past Medical History:   Diagnosis Date    Acne 10/1/2018    Resolved through oral and topical medications    Fibroids     Right arm fracture      Past Surgical History:   Procedure Laterality Date     SECTION      HYSTERECTOMY      DV, no BSO for fibroids. Dr. Metcalf    INJECTION, SPINE, LUMBOSACRAL, TRANSFORAMINAL APPROACH Right 2025     Procedure: Right L5 Transforaminal Epidural Steroid Injection with right L4-5 Facet Injection/Cyst Rupture;  Surgeon: Reece Galo Jr., MD;  Location: [facility] PAIN [facility]NA[facility];  Service: Pain Management;  Laterality: Right;  @1330(given)  No ATC or DM  MD Sign.    LEFT THUMB SURGERY      right arm surgery prior fracture  1986       Social History[1]     Review of Systems:  As noted in HPI    OBJECTIVE:     Vital Signs (Most Recent):  Vitals:    07/22/25 1038   BP: (!) 177/81   Pulse: (!) 115   SpO2: 100%   Weight: 82.5 kg (181 lb 14.1 oz)   PainSc:   6   PainLoc: Back     Body mass index is 30.27 kg/m².      Physical Exam:  General: well developed, well nourished, no distress  Head: normocephalic, atraumatic  Neurologic: Alert and oriented. Thought content appropriate  GCS: Motor: 6/Verbal: 5/Eyes: 4 GCS Total: 15  Language: No aphasia  Speech: No dysarthria  Cranial nerves: face symmetric, tongue midline, CN II-XII grossly intact.   Eyes: pupils equal, round, reactive to light with accommodation, EOMI.   Pulmonary: normal respirations, not labored, no accessory muscles used    Sensory: intact to light touch throughout BLE    Motor Strength: Moves all extremities spontaneously with good tone.  Full strength lower extremities. No abnormal movements seen.     Strength  Iliopsoas Quadriceps Tibialis  anterior Gastro- cnemius EHL   Lower: R 5/5 5/5 5/5 5/5 5/5    L 5/5 5/5 5/5 5/5 5/5     Clonus:  2 beats bilaterally    Gait: normal      Diagnostic Results:  I have personally reviewed imaging and agree with the findings.     MRI lumbar spine 07/10/2025   Approximately 1 cm RIGHT-sided synovial cyst adjacent to facet L4-L5 with mass effect upon the RIGHT L5 nerve root and thecal sac margins.     Mild degenerative changes L5-S1 inclusive of Modic degenerative change as above with bilateral neural foraminal encroachment, mild.    MRI lumbar spine, 04/06/2025:  7 mm RIGHT-sided synovial cyst/facet cyst L4-L5 as  above.    X-ray lumbar spine with flexion-extension views 04/09/2025:   Grade 1 anterolisthesis of L4 on L5. No dynamic instability.       ASSESSMENT/PLAN:     Maria Alejandra Perez is a 45 y.o. female who presents for follow up of right lumbar radiculopathy 2/2 a right-sided synovial cyst at L4-5 displacing the right L5 nerve root.  She remains neurologically intact on exam for me today. Cyst rupture was successful but only lasted for 5 or 6 weeks.  The cyst has now returned and is approximately 1 mm larger than it was before the rupture.  Dr. Galo does not recommend rerupture.  She has continued to participate in physical therapy since her last visit.    We discussed surgical options for cyst resection.  Although she does not have gross instability on flexion-extension films, there is likely some degree of instability given how quickly the cyst recurred and the presence of back pain.  We could perform an L4-5 TLIF for definitive resolution of the cyst and to prevent recurrence, but I would favor a laminectomy 1st to hopefully postpone the need for a fusion.  I also explained that she will likely need a fusion at some point in the future and performing the laminectomy may inadvertently worsen instability causing the need to proceed with a lumbar fusion.    Lastly, I will discuss her case with Dr. Delacruz to determine if she is a candidate for endoscopic resection of the cyst.    She does have a 2nd opinion with Nadya scheduled for tomorrow.  If a less invasive option is available, she would prefer to stay with Dr. Delacruz here at [facility].    I will follow up with the patient after her 2nd opinion visit and after speaking with Dr. Delacruz to finalize surgical planning.      Please feel free to call with any further questions      Ivana Cevallos PA-C  [facility]  Department of Neurosurgery  [phone]    Disclaimer: This note was dictated by speech recognition. Minor errors in transcription may be  present.  Please call with any questions.           [1]   Social History  Tobacco Use    Smoking status: Former     Current packs/day: 1.00     Average packs/day: 1 pack/day for 10.0 years (10.0 ttl pk-yrs)     Types: Cigarettes    Smokeless tobacco: Never   Substance Use Topics    Alcohol use: Yes     Alcohol/week: 0.0 standard drinks of alcohol     Comment: social    Drug use: No

## 2025-08-12 ENCOUNTER — PATIENT MESSAGE (OUTPATIENT)
Dept: NEUROSURGERY | Facility: CLINIC | Age: 45
End: 2025-08-12
Payer: COMMERCIAL